# Patient Record
Sex: FEMALE | Race: WHITE | NOT HISPANIC OR LATINO | Employment: PART TIME | URBAN - METROPOLITAN AREA
[De-identification: names, ages, dates, MRNs, and addresses within clinical notes are randomized per-mention and may not be internally consistent; named-entity substitution may affect disease eponyms.]

---

## 2019-01-18 PROBLEM — E78.00 ELEVATED CHOLESTEROL: Status: ACTIVE | Noted: 2019-01-18

## 2019-04-15 PROBLEM — E66.9 OBESITY (BMI 30.0-34.9): Status: ACTIVE | Noted: 2019-04-15

## 2019-04-15 PROBLEM — E66.811 OBESITY (BMI 30.0-34.9): Status: ACTIVE | Noted: 2019-04-15

## 2019-04-29 ENCOUNTER — OFFICE VISIT (OUTPATIENT)
Dept: INTERNAL MEDICINE | Facility: CLINIC | Age: 59
End: 2019-04-29

## 2019-04-29 ENCOUNTER — LAB (OUTPATIENT)
Dept: LAB | Facility: HOSPITAL | Age: 59
End: 2019-04-29

## 2019-04-29 VITALS
HEART RATE: 60 BPM | WEIGHT: 159 LBS | DIASTOLIC BLOOD PRESSURE: 80 MMHG | SYSTOLIC BLOOD PRESSURE: 116 MMHG | BODY MASS INDEX: 29.26 KG/M2 | HEIGHT: 62 IN

## 2019-04-29 DIAGNOSIS — Z13.220 SCREENING, LIPID: ICD-10-CM

## 2019-04-29 DIAGNOSIS — Z20.5 EXPOSURE TO HEPATITIS C: ICD-10-CM

## 2019-04-29 DIAGNOSIS — Z23 NEED FOR HEPATITIS A VACCINATION: ICD-10-CM

## 2019-04-29 DIAGNOSIS — R21 RASH: ICD-10-CM

## 2019-04-29 DIAGNOSIS — Z00.00 PREVENTATIVE HEALTH CARE: Primary | ICD-10-CM

## 2019-04-29 DIAGNOSIS — Z13.29 SCREENING FOR ENDOCRINE DISORDER: ICD-10-CM

## 2019-04-29 DIAGNOSIS — E78.00 ELEVATED CHOLESTEROL: ICD-10-CM

## 2019-04-29 DIAGNOSIS — E66.3 OVERWEIGHT (BMI 25.0-29.9): ICD-10-CM

## 2019-04-29 LAB
ALBUMIN SERPL-MCNC: 4.2 G/DL (ref 3.5–5.2)
ALBUMIN/GLOB SERPL: 1.6 G/DL
ALP SERPL-CCNC: 50 U/L (ref 39–117)
ALT SERPL W P-5'-P-CCNC: 39 U/L (ref 1–33)
ANION GAP SERPL CALCULATED.3IONS-SCNC: 12.7 MMOL/L
AST SERPL-CCNC: 44 U/L (ref 1–32)
BILIRUB SERPL-MCNC: 0.7 MG/DL (ref 0.2–1.2)
BUN BLD-MCNC: 13 MG/DL (ref 8–23)
BUN/CREAT SERPL: 15.1 (ref 7–25)
CALCIUM SPEC-SCNC: 10 MG/DL (ref 8.6–10.5)
CHLORIDE SERPL-SCNC: 102 MMOL/L (ref 98–107)
CHOLEST SERPL-MCNC: 270 MG/DL (ref 0–200)
CO2 SERPL-SCNC: 26.3 MMOL/L (ref 22–29)
CREAT BLD-MCNC: 0.86 MG/DL (ref 0.57–1)
GFR SERPL CREATININE-BSD FRML MDRD: 67 ML/MIN/1.73
GLOBULIN UR ELPH-MCNC: 2.7 GM/DL
GLUCOSE BLD-MCNC: 92 MG/DL (ref 65–99)
HDLC SERPL-MCNC: 94 MG/DL (ref 40–60)
LDLC SERPL CALC-MCNC: 154 MG/DL (ref 0–100)
LDLC/HDLC SERPL: 1.64 {RATIO}
POTASSIUM BLD-SCNC: 4.4 MMOL/L (ref 3.5–5.2)
PROT SERPL-MCNC: 6.9 G/DL (ref 6–8.5)
SODIUM BLD-SCNC: 141 MMOL/L (ref 136–145)
TRIGL SERPL-MCNC: 111 MG/DL (ref 0–150)
TSH SERPL DL<=0.05 MIU/L-ACNC: 1.84 MIU/ML (ref 0.27–4.2)
VLDLC SERPL-MCNC: 22.2 MG/DL (ref 5–40)

## 2019-04-29 PROCEDURE — 84443 ASSAY THYROID STIM HORMONE: CPT

## 2019-04-29 PROCEDURE — 80061 LIPID PANEL: CPT

## 2019-04-29 PROCEDURE — 90471 IMMUNIZATION ADMIN: CPT | Performed by: INTERNAL MEDICINE

## 2019-04-29 PROCEDURE — 86803 HEPATITIS C AB TEST: CPT

## 2019-04-29 PROCEDURE — 80053 COMPREHEN METABOLIC PANEL: CPT

## 2019-04-29 PROCEDURE — 99396 PREV VISIT EST AGE 40-64: CPT | Performed by: INTERNAL MEDICINE

## 2019-04-29 PROCEDURE — 90632 HEPA VACCINE ADULT IM: CPT | Performed by: INTERNAL MEDICINE

## 2019-04-29 NOTE — ASSESSMENT & PLAN NOTE
Obesity is improving with lifestyle modifications.  .Goal to consume large amounts of vegetables and fruits,heart healthy fats and low carbohydrate choices. Encourage aerobic exercise of walking, jogging or biking gradually up to 150 minute a week and 2-3 times of weight resistance. Employe behavioral modifications such as daily meditation and stopping eating and drinking calories after 7 pm.

## 2019-04-29 NOTE — ASSESSMENT & PLAN NOTE
.Discussed improving diet and exercise. Specifically, decrease saturated fats and trans fats and avoid bad carbohydrates (sweet, breads , potatoes, and high caloric drinks).  Also aim for 150 minutes aerobic exercise weekly and resistance exercises 2-3x/week.

## 2019-04-29 NOTE — PROGRESS NOTES
"Chief Complaint   Patient presents with   • Annual Exam     fasting   follow up an skin and weight and labs    History of Present Illness  58 y.o. white female presents for wellness exam. She has had mild rash.    Review of Systems   Constitutional: Negative for chills, fatigue and fever.   HENT: Negative for congestion, ear pain and sinus pressure.    Respiratory: Negative for cough, chest tightness, shortness of breath and wheezing.    Cardiovascular: Negative for chest pain and palpitations.   Gastrointestinal: Negative for abdominal pain, blood in stool and constipation.   Musculoskeletal: Negative for arthralgias.   Skin: Positive for rash. Negative for color change.   Allergic/Immunologic: Negative for environmental allergies.   Neurological: Negative for dizziness, speech difficulty and headaches.   Hematological: Negative for adenopathy.   Psychiatric/Behavioral: Negative for confusion. The patient is not nervous/anxious.      All other ROS reviewed and negative.    PMSFH:  The following portions of the patient's history were reviewed and updated as appropriate: allergies, current medications, past family history, past medical history, past social history, past surgical history and problem list.    No current outpatient medications on file.    VITALS:  /80 (BP Location: Left arm, Patient Position: Sitting, Cuff Size: Adult)   Pulse 60   Ht 157.5 cm (62\")   Wt 72.1 kg (159 lb)   BMI 29.08 kg/m²     Physical Exam   Constitutional: She is oriented to person, place, and time. She appears well-developed and well-nourished.   HENT:   Head: Normocephalic.   Right Ear: External ear normal.   Left Ear: External ear normal.   Mouth/Throat: Oropharynx is clear and moist.   Eyes: Conjunctivae and EOM are normal.   Neck: No JVD present.   Cardiovascular: Normal rate, regular rhythm and normal heart sounds.   Pulmonary/Chest: Effort normal and breath sounds normal. No respiratory distress.   Abdominal: Soft. " Bowel sounds are normal. There is no tenderness.   Lymphadenopathy:     She has no cervical adenopathy.   Neurological: She is alert and oriented to person, place, and time.   Skin: Skin is warm and dry. Rash (mild erythema patches on left arm and scale left cheek) noted.   Psychiatric: She has a normal mood and affect. Her behavior is normal.   Nursing note and vitals reviewed.      LABS:  No results found for this or any previous visit.    Procedures         ASSESSMENT/PLAN:  Problem List Items Addressed This Visit        Cardiovascular and Mediastinum    Elevated cholesterol     .Discussed improving diet and exercise. Specifically, decrease saturated fats and trans fats and avoid bad carbohydrates (sweet, breads , potatoes, and high caloric drinks).  Also aim for 150 minutes aerobic exercise weekly and resistance exercises 2-3x/week.              Other    Overweight (BMI 25.0-29.9)     Obesity is improving with lifestyle modifications.  .Goal to consume large amounts of vegetables and fruits,heart healthy fats and low carbohydrate choices. Encourage aerobic exercise of walking, jogging or biking gradually up to 150 minute a week and 2-3 times of weight resistance. Employe behavioral modifications such as daily meditation and stopping eating and drinking calories after 7 pm.          Preventative health care - Primary     .Age-appropriate Counseling:  Discussed preventative medicine issues with patient including regular exercise, healthy diet, stress reduction, adequate sleep and recommended age-appropriate screening studies.  Immunizations reviewed.   Get Hepatitis A vaccine today and check for Hep C and check labs.                Other Visit Diagnoses     Screening for endocrine disorder        Relevant Orders    Lipid Panel    Comprehensive Metabolic Panel    Screening, lipid        Relevant Orders    TSH Rfx On Abnormal To Free T4    Exposure to hepatitis C        Relevant Orders    Hepatitis C Antibody     Need for hepatitis A vaccination        Relevant Orders    Hepatitis A Vaccine Adult IM (Completed)    Rash        Refer to Dermatology          FOLLOW-UP:  Return in about 1 year (around 4/29/2020) for Annual physical, Labs this visit.      Electronically signed by:    Mitul Robles MD

## 2019-04-29 NOTE — ASSESSMENT & PLAN NOTE
.Age-appropriate Counseling:  Discussed preventative medicine issues with patient including regular exercise, healthy diet, stress reduction, adequate sleep and recommended age-appropriate screening studies.  Immunizations reviewed.   Get Hepatitis A vaccine today and check for Hep C and check labs.

## 2019-04-30 LAB — HCV AB SER DONR QL: NORMAL

## 2019-05-20 ENCOUNTER — OFFICE VISIT (OUTPATIENT)
Dept: INTERNAL MEDICINE | Facility: CLINIC | Age: 59
End: 2019-05-20

## 2019-05-20 VITALS
TEMPERATURE: 98.4 F | WEIGHT: 162 LBS | DIASTOLIC BLOOD PRESSURE: 88 MMHG | SYSTOLIC BLOOD PRESSURE: 122 MMHG | HEIGHT: 62 IN | HEART RATE: 76 BPM | BODY MASS INDEX: 29.81 KG/M2

## 2019-05-20 DIAGNOSIS — R60.0 SALIVARY GLAND SWELLING: ICD-10-CM

## 2019-05-20 DIAGNOSIS — H92.01 ACUTE EAR PAIN, RIGHT: ICD-10-CM

## 2019-05-20 DIAGNOSIS — M54.2 NECK PAIN: Primary | ICD-10-CM

## 2019-05-20 PROCEDURE — 99214 OFFICE O/P EST MOD 30 MIN: CPT | Performed by: INTERNAL MEDICINE

## 2019-05-20 RX ORDER — CEFDINIR 300 MG/1
300 CAPSULE ORAL 2 TIMES DAILY
Qty: 14 CAPSULE | Refills: 0 | Status: SHIPPED | OUTPATIENT
Start: 2019-05-20 | End: 2019-05-27

## 2019-05-20 RX ORDER — TRAMADOL HYDROCHLORIDE 50 MG/1
50 TABLET ORAL EVERY 6 HOURS PRN
Qty: 30 TABLET | Refills: 0 | Status: SHIPPED | OUTPATIENT
Start: 2019-05-20 | End: 2020-08-24

## 2019-05-20 NOTE — PROGRESS NOTES
Chief Complaint   Patient presents with   • Neck Pain     R, started thursday   • Earache     R, started Friday       History of Present Illness  58 y.o. female presents for acute neck pain. The pain is on the right side. Onset was 5 days ago. The pain is gradually improving. Becomes worse at night. Aggravated by lying down and chewing. Associated symptoms include pressure in head, swelling, pain in right ear. Pertinent negatives include nausea.     Review of Systems   Constitutional: Negative for chills and fever.   HENT: Positive for ear pain, facial swelling and sinus pressure.    Eyes: Negative for pain.   Respiratory: Negative for cough and shortness of breath.    Cardiovascular: Negative for chest pain, palpitations and leg swelling.   Gastrointestinal: Negative for abdominal pain, diarrhea, nausea and vomiting.   Genitourinary: Negative for dysuria and hematuria.   Musculoskeletal: Positive for neck pain. Negative for arthralgias, back pain and joint swelling.   Skin: Negative for color change and rash.   Allergic/Immunologic: Negative for environmental allergies.   Neurological: Negative for dizziness, light-headedness and headaches.   Psychiatric/Behavioral: Negative for dysphoric mood. The patient is not nervous/anxious.    All other systems reviewed and are negative.    All other ROS reviewed and negative.    PMSFH:  The following portions of the patient's history were reviewed and updated as appropriate: allergies, current medications, past family history, past medical history, past social history, past surgical history and problem list.      Current Outpatient Medications:   •  cefdinir (OMNICEF) 300 MG capsule, Take 1 capsule by mouth 2 (Two) Times a Day for 7 days., Disp: 14 capsule, Rfl: 0  •  traMADol (ULTRAM) 50 MG tablet, Take 1 tablet by mouth Every 6 (Six) Hours As Needed for Moderate Pain ., Disp: 30 tablet, Rfl: 0    VITALS:  /88   Pulse 76   Temp 98.4 °F (36.9 °C)   Ht 157.5 cm  "(62.01\")   Wt 73.5 kg (162 lb)   BMI 29.62 kg/m²     Physical Exam   Constitutional: She is oriented to person, place, and time. She appears well-developed and well-nourished.   HENT:   Head: Normocephalic.   Right Ear: External ear normal.   Left Ear: External ear normal.   Mouth/Throat: Oropharynx is clear and moist.   Eyes: Conjunctivae and EOM are normal.   Neck:   Right sided preauricular fullness and pain   Cardiovascular: Normal rate, regular rhythm and normal heart sounds.   Pulmonary/Chest: Effort normal and breath sounds normal. No respiratory distress.   Abdominal: Soft. Bowel sounds are normal. There is no tenderness.   Musculoskeletal: She exhibits no edema.   Lymphadenopathy:     She has cervical adenopathy.   Neurological: She is alert and oriented to person, place, and time.   Skin: Skin is warm and dry.   Psychiatric: She has a normal mood and affect. Her behavior is normal.   Nursing note and vitals reviewed.      LABS:  Results for orders placed or performed in visit on 04/29/19   Lipid Panel   Result Value Ref Range    Total Cholesterol 270 (H) 0 - 200 mg/dL    Triglycerides 111 0 - 150 mg/dL    HDL Cholesterol 94 (H) 40 - 60 mg/dL    LDL Cholesterol  154 (H) 0 - 100 mg/dL    VLDL Cholesterol 22.2 5 - 40 mg/dL    LDL/HDL Ratio 1.64    Comprehensive Metabolic Panel   Result Value Ref Range    Glucose 92 65 - 99 mg/dL    BUN 13 8 - 23 mg/dL    Creatinine 0.86 0.57 - 1.00 mg/dL    Sodium 141 136 - 145 mmol/L    Potassium 4.4 3.5 - 5.2 mmol/L    Chloride 102 98 - 107 mmol/L    CO2 26.3 22.0 - 29.0 mmol/L    Calcium 10.0 8.6 - 10.5 mg/dL    Total Protein 6.9 6.0 - 8.5 g/dL    Albumin 4.20 3.50 - 5.20 g/dL    ALT (SGPT) 39 (H) 1 - 33 U/L    AST (SGOT) 44 (H) 1 - 32 U/L    Alkaline Phosphatase 50 39 - 117 U/L    Total Bilirubin 0.7 0.2 - 1.2 mg/dL    eGFR Non African Amer 67 >60 mL/min/1.73    Globulin 2.7 gm/dL    A/G Ratio 1.6 g/dL    BUN/Creatinine Ratio 15.1 7.0 - 25.0    Anion Gap 12.7 mmol/L "   TSH Rfx On Abnormal To Free T4   Result Value Ref Range    TSH 1.840 0.270 - 4.200 mIU/mL   Hepatitis C Antibody   Result Value Ref Range    Hepatitis C Ab Non-Reactive Non-Reactive       Procedures         ASSESSMENT/PLAN:  Problem List Items Addressed This Visit        Nervous and Auditory    Neck pain - Primary     RX for Tramadol.   She is aware of the [facility] Controlled Substance Contract.  ANAYA will be performed, The patient is aware of the potential for addiction and dependence as well as risks of drug interactions and potential side effects.  Patient voices understanding and would like to proceed with medication as prescribed.           Other Visit Diagnoses     Salivary gland swelling        Warm compresses and occasionally suck on regan and cough drops. Refer to ENT.    Relevant Orders    Ambulatory Referral to ENT (Otolaryngology) (Completed)    Acute ear pain, right        Acute pain that started a day after the onset of neck pain. Start on cefdinir antibiotics. Make sure to get plenty of rest and push fluids. Refer to ENT.     Relevant Orders    Ambulatory Referral to ENT (Otolaryngology) (Completed)          FOLLOW-UP:  Return for Next scheduled follow up.      Electronically signed by:    Mitul Robles MD

## 2019-05-20 NOTE — ASSESSMENT & PLAN NOTE
RX for Tramadol.   She is aware of the [facility] Controlled Substance Contract.  ANAYA will be performed, The patient is aware of the potential for addiction and dependence as well as risks of drug interactions and potential side effects.  Patient voices understanding and would like to proceed with medication as prescribed.

## 2019-09-30 ENCOUNTER — FLU SHOT (OUTPATIENT)
Dept: INTERNAL MEDICINE | Facility: CLINIC | Age: 59
End: 2019-09-30

## 2019-09-30 DIAGNOSIS — Z23 FLU VACCINE NEED: ICD-10-CM

## 2019-09-30 PROCEDURE — 90674 CCIIV4 VAC NO PRSV 0.5 ML IM: CPT | Performed by: INTERNAL MEDICINE

## 2019-09-30 PROCEDURE — 90471 IMMUNIZATION ADMIN: CPT | Performed by: INTERNAL MEDICINE

## 2020-07-08 ENCOUNTER — PATIENT MESSAGE (OUTPATIENT)
Dept: INTERNAL MEDICINE | Facility: CLINIC | Age: 60
End: 2020-07-08

## 2020-08-24 RX ORDER — IBUPROFEN 200 MG
200 TABLET ORAL EVERY 6 HOURS PRN
COMMUNITY
End: 2020-10-05 | Stop reason: ALTCHOICE

## 2020-08-25 ENCOUNTER — OFFICE VISIT (OUTPATIENT)
Dept: BARIATRICS/WEIGHT MGMT | Facility: CLINIC | Age: 60
End: 2020-08-25

## 2020-08-25 VITALS
SYSTOLIC BLOOD PRESSURE: 116 MMHG | RESPIRATION RATE: 18 BRPM | OXYGEN SATURATION: 99 % | DIASTOLIC BLOOD PRESSURE: 60 MMHG | TEMPERATURE: 98 F | WEIGHT: 171 LBS | HEIGHT: 62 IN | BODY MASS INDEX: 31.47 KG/M2 | HEART RATE: 58 BPM

## 2020-08-25 DIAGNOSIS — G47.00 INSOMNIA, UNSPECIFIED TYPE: ICD-10-CM

## 2020-08-25 DIAGNOSIS — E66.9 OBESITY, CLASS I, BMI 30-34.9: Primary | ICD-10-CM

## 2020-08-25 DIAGNOSIS — Z86.39 HISTORY OF ELEVATED LIPIDS: ICD-10-CM

## 2020-08-25 PROBLEM — E66.09 CLASS 1 OBESITY DUE TO EXCESS CALORIES WITHOUT SERIOUS COMORBIDITY WITH BODY MASS INDEX (BMI) OF 31.0 TO 31.9 IN ADULT: Status: ACTIVE | Noted: 2020-08-25

## 2020-08-25 PROBLEM — E66.811 CLASS 1 OBESITY DUE TO EXCESS CALORIES WITHOUT SERIOUS COMORBIDITY WITH BODY MASS INDEX (BMI) OF 31.0 TO 31.9 IN ADULT: Status: ACTIVE | Noted: 2020-08-25

## 2020-08-25 PROCEDURE — MEDWTNEWPT: Performed by: NURSE PRACTITIONER

## 2020-08-25 RX ORDER — LORATADINE 10 MG/1
10 TABLET ORAL DAILY
COMMUNITY
End: 2020-10-05 | Stop reason: ALTCHOICE

## 2020-08-25 RX ORDER — ACETAMINOPHEN 500 MG
500 TABLET ORAL EVERY 6 HOURS PRN
COMMUNITY
End: 2020-10-05

## 2020-08-25 RX ORDER — MELATONIN 3 MG
TABLET ORAL
Qty: 90 EACH | Refills: 0
Start: 2020-08-25 | End: 2020-10-05

## 2020-08-25 NOTE — PROGRESS NOTES
[facility]  [address]  [place] [address]    Name: Varsha Lockhart  : 1960  Patient Care Team:  Mitul Robles MD as PCP - General (Internal Medicine)  Mitul Robles MD as PCP - Internal Medicine (Internal Medicine)    Chief Complaint;:   Chief Complaint   Patient presents with   • Nutrition Counseling   • Weight Loss        HPI   Office visit for Ms. Varsha Lockhart is a 60 y.o. female with Class 1 obesity and history of hyperlipidemia here to restart the weight loss program after an absence of 1year and 8 months. She is currently at her highest weight of her life at 171 lbs. She previously used medications including phentermine, Phenfen, and ephedra but tended to stop seeing weight drop after loosing around 10 lbs while on sympathomimetics.  The patient is not using a food journal but is open to the idea.  The patient is checking weight regularly on a weekly basis. Body mass index is 31.28 kg/m².  Recently has had to decrease her physical activity due to L knee injuries and unable to play Tennis, which was a stress and social outlet for her. She see PT 2x day for this injury. She does try to remain physically active. She continues to walk around 15 miles/week. She tends to have night time cravings for cracker and other snacky carbohydrates after dinner into the night. She typically eats dinner around 5:30 because  sleeps early around 7:30pm. She has had trouble with insomnia since menopause but has never tried any medications for sleep so far.  She currently does not take a multivitamin or Omega-3 supplement.  She eats 5 servings of fish week.       Review of Systems   Constitutional: Negative for activity change (denies slow movement) and fatigue.        Denies diminished sweating,  loss of appetite   HENT: Negative for facial swelling (deneis swelling of face and eyelids) and voice change (denies hoarseness).         Denies tongue  "changes   Respiratory:        Denies difficulty breathing   Cardiovascular: Negative for palpitations and leg swelling.   Gastrointestinal: Negative for constipation.   Endocrine: Negative for cold intolerance.        Denies dry, course skin, course hair, loss of hair, slowed movement   Genitourinary:        Denies excessive/painful menses   Skin:        Denies pale skin, brittle nails   Neurological: Negative for speech difficulty (denies slowed speech), weakness and headache.   Psychiatric/Behavioral: Negative for depressed mood. The patient is not nervous/anxious.         Denies emotional instability       The patient is exercising with a FITT score of:     Frequency   Intensity Time Strength Training   []   0 None  []   0 None  []   0 None  []   0 None    []   1 (1-2x/week) []   1 (light) []   1 (<10 min) []   1 (1x/week)   []   2 (3-5x/week) []   2 (moderate) []   2 (10-20 min) [x]   2 (2x/week)   [x]   3 (daily)   [x]   3 (moderately hard)  []   4 (very hard) []   3 (20-30 min)  [x]   4 (>30 min) []   3 (3-4x/week)         VS   Vitals:    20 0902   BP: 116/60   BP Location: Left arm   Patient Position: Sitting   Cuff Size: Large Adult   Pulse: 58   Resp: 18   Temp: 98 °F (36.7 °C)   TempSrc: Temporal   SpO2: 99%   Weight: 77.6 kg (171 lb)   Height: 157.5 cm (62\")       Restart Weight:  171lb  Change in weight since last visit: 18lb gain    Body composition analysis completed and showed:  %body fat: 40.9%  Total fat mass (in lbs): 70.0  Fat free mass (in lbs): 101.0  Total body water (in lb): 74.0  BMR: 1401     Measurements (in inches)  Neck: 13  Chest: 42.5  Waist: 38  Hips: 42.5  Thighs: 39    History    Past Medical History:   Diagnosis Date   • Depression    • Headache     negative CT scan of head   • High cholesterol    •  labor     and pre-eclampsia with loss of  child   • Strain of right Achilles tendon 2016    massage therapy and rest       Patient Active Problem List   Diagnosis "   • Elevated cholesterol   • Overweight   • Preventative health care   • Neck pain   • Class 1 obesity due to excess calories without serious comorbidity with body mass index (BMI) of 31.0 to 31.9 in adult       No Known Allergies      Current Outpatient Medications:   •  acetaminophen (TYLENOL) 500 MG tablet, Take 500 mg by mouth Every 6 (Six) Hours As Needed for Mild Pain ., Disp: , Rfl:   •  ibuprofen (ADVIL,MOTRIN) 200 MG tablet, Take 200 mg by mouth Every 6 (Six) Hours As Needed for Mild Pain ., Disp: , Rfl:   •  loratadine (Allergy) 10 MG tablet, Take 10 mg by mouth Daily., Disp: , Rfl:   •  5-Hydroxytryptophan (5-HTP) 50 MG capsule, Use up to 6 sprays daily. May cause drowsiness with full dose., Disp: 90 each, Rfl: 0  •  Multiple Vitamins-Minerals (MULTIVITAMIN ADULTS) tablet, Take 1 tablet by mouth Daily., Disp: 30 tablet, Rfl: 2    Physical Exam:    General:  well developed; well nourished  no acute distress   Lungs:  breathing is unlabored  clear to auscultation bilaterally   Heart:  Joce Exam-cardiac obesity: regular rate and rhythm, S1, S2 normal, no murmur, click, rub or gallop       ASSESSMENT/PLAN:       Restart program and recommended supplements. Advised that it is okay to not take an omega 3 supplement due to her high natural fish intake.  Discussed Bring back nutritional focus and work on lifestyle behavioral changes including sleep hygrine, handout provided.  Recommend restarting food journal using goal card for guidance.     I have instructed patient to restart the pursuit of medical weight loss as a part of this program.  The current plan for this month includes:   1) Restart HTP& multivitamin  2) Track daily food, discussed electronic food journal katey [employer] pal   3) Consider meal replacement with one protein shake/day and one other food based set meal replacement  4) Eat a high protein snack between dinner and 8pm  5) Set 8pm as a time she stops eating for the evening  6) Practice  sleep hygiene habits  7) Get labwork prior to next appointment     Patient will work toward a FITT score of:     Frequency   Intensity Time Strength Training   []   0 None  []   0 None  []   0 None  [x]   0 None    []   1 (1-2x/week) [x]   1 (light) []   1 (<10 min) []   1 (1x/week)   []   2 (3-5x/week) [x]   2 (moderate) []   2 (10-20 min) []   2 (2x/week)   [x]   3 (daily)   []   3 (moderately hard)  []   4 (very hard) [x]   3 (20-30 min)  []   4 (>30 min) []   3 (3-4x/week)       Plan of care reviewed with patient at the conclusion of today's visit. We discussed the risks, benefits, and limitations of treatments. Patient verbalizes understanding of and agreement with management plan.     The patient has read and signed the [facility] Controlled Substance Contract. We discussed the risks and benefits of the use of controlled substances, including the risk of tolerance and drug dependence. Anorectic medications can be prescribed by one provider at a time and dispensed from one facility at a time, they can only be taken as prescribed, and we are not obligated to refill them if lost or stolen. The refills are only during regular clinic hours. Frederic report was pulled on patient, reviewed and found to be appropriate.      Return in about 2 weeks (around 9/8/2020) for Next scheduled follow up.     JAS Mayorga

## 2020-08-28 ENCOUNTER — LAB (OUTPATIENT)
Dept: LAB | Facility: HOSPITAL | Age: 60
End: 2020-08-28

## 2020-08-28 DIAGNOSIS — Z86.39 HISTORY OF ELEVATED LIPIDS: ICD-10-CM

## 2020-08-28 DIAGNOSIS — E66.9 OBESITY, CLASS I, BMI 30-34.9: ICD-10-CM

## 2020-08-28 LAB
ALBUMIN SERPL-MCNC: 4.2 G/DL (ref 3.5–5.2)
ALBUMIN/GLOB SERPL: 1.5 G/DL
ALP SERPL-CCNC: 50 U/L (ref 39–117)
ALT SERPL W P-5'-P-CCNC: 96 U/L (ref 1–33)
ANION GAP SERPL CALCULATED.3IONS-SCNC: 11.9 MMOL/L (ref 5–15)
AST SERPL-CCNC: 60 U/L (ref 1–32)
BASOPHILS # BLD AUTO: 0.04 10*3/MM3 (ref 0–0.2)
BASOPHILS NFR BLD AUTO: 0.8 % (ref 0–1.5)
BILIRUB SERPL-MCNC: 0.7 MG/DL (ref 0–1.2)
BUN SERPL-MCNC: 18 MG/DL (ref 8–23)
BUN/CREAT SERPL: 22.2 (ref 7–25)
CALCIUM SPEC-SCNC: 9.4 MG/DL (ref 8.6–10.5)
CHLORIDE SERPL-SCNC: 103 MMOL/L (ref 98–107)
CHOLEST SERPL-MCNC: 260 MG/DL (ref 0–200)
CO2 SERPL-SCNC: 25.1 MMOL/L (ref 22–29)
CREAT SERPL-MCNC: 0.81 MG/DL (ref 0.57–1)
DEPRECATED RDW RBC AUTO: 47 FL (ref 37–54)
EOSINOPHIL # BLD AUTO: 0.49 10*3/MM3 (ref 0–0.4)
EOSINOPHIL NFR BLD AUTO: 9.9 % (ref 0.3–6.2)
ERYTHROCYTE [DISTWIDTH] IN BLOOD BY AUTOMATED COUNT: 12.6 % (ref 12.3–15.4)
GFR SERPL CREATININE-BSD FRML MDRD: 72 ML/MIN/1.73
GLOBULIN UR ELPH-MCNC: 2.8 GM/DL
GLUCOSE SERPL-MCNC: 91 MG/DL (ref 65–99)
HCT VFR BLD AUTO: 47.8 % (ref 34–46.6)
HDLC SERPL-MCNC: 77 MG/DL (ref 40–60)
HGB BLD-MCNC: 15.8 G/DL (ref 12–15.9)
IMM GRANULOCYTES # BLD AUTO: 0 10*3/MM3 (ref 0–0.05)
IMM GRANULOCYTES NFR BLD AUTO: 0 % (ref 0–0.5)
LDLC SERPL CALC-MCNC: 166 MG/DL (ref 0–100)
LDLC/HDLC SERPL: 2.15 {RATIO}
LYMPHOCYTES # BLD AUTO: 1.99 10*3/MM3 (ref 0.7–3.1)
LYMPHOCYTES NFR BLD AUTO: 40.1 % (ref 19.6–45.3)
MCH RBC QN AUTO: 33 PG (ref 26.6–33)
MCHC RBC AUTO-ENTMCNC: 33.1 G/DL (ref 31.5–35.7)
MCV RBC AUTO: 99.8 FL (ref 79–97)
MONOCYTES # BLD AUTO: 0.55 10*3/MM3 (ref 0.1–0.9)
MONOCYTES NFR BLD AUTO: 11.1 % (ref 5–12)
NEUTROPHILS NFR BLD AUTO: 1.89 10*3/MM3 (ref 1.7–7)
NEUTROPHILS NFR BLD AUTO: 38.1 % (ref 42.7–76)
NRBC BLD AUTO-RTO: 0 /100 WBC (ref 0–0.2)
PLATELET # BLD AUTO: 217 10*3/MM3 (ref 140–450)
PMV BLD AUTO: 11.8 FL (ref 6–12)
POTASSIUM SERPL-SCNC: 4.5 MMOL/L (ref 3.5–5.2)
PROT SERPL-MCNC: 7 G/DL (ref 6–8.5)
RBC # BLD AUTO: 4.79 10*6/MM3 (ref 3.77–5.28)
SODIUM SERPL-SCNC: 140 MMOL/L (ref 136–145)
T3FREE SERPL-MCNC: 3.46 PG/ML (ref 2–4.4)
T4 FREE SERPL-MCNC: 1.18 NG/DL (ref 0.93–1.7)
TRIGL SERPL-MCNC: 87 MG/DL (ref 0–150)
TSH SERPL DL<=0.05 MIU/L-ACNC: 2.51 UIU/ML (ref 0.27–4.2)
VLDLC SERPL-MCNC: 17.4 MG/DL (ref 5–40)
WBC # BLD AUTO: 4.96 10*3/MM3 (ref 3.4–10.8)

## 2020-08-28 PROCEDURE — 84443 ASSAY THYROID STIM HORMONE: CPT

## 2020-08-28 PROCEDURE — 80053 COMPREHEN METABOLIC PANEL: CPT

## 2020-08-28 PROCEDURE — 36415 COLL VENOUS BLD VENIPUNCTURE: CPT

## 2020-08-28 PROCEDURE — 82652 VIT D 1 25-DIHYDROXY: CPT

## 2020-08-28 PROCEDURE — 83525 ASSAY OF INSULIN: CPT

## 2020-08-28 PROCEDURE — 84439 ASSAY OF FREE THYROXINE: CPT

## 2020-08-28 PROCEDURE — 84481 FREE ASSAY (FT-3): CPT

## 2020-08-28 PROCEDURE — 80061 LIPID PANEL: CPT

## 2020-08-28 PROCEDURE — 85025 COMPLETE CBC W/AUTO DIFF WBC: CPT

## 2020-08-31 LAB
1,25(OH)2D3 SERPL-MCNC: 37.6 PG/ML (ref 19.9–79.3)
INSULIN SERPL-ACNC: 13.7 UIU/ML (ref 2.6–24.9)

## 2020-09-01 PROBLEM — R74.8 ELEVATED LIVER ENZYMES: Status: ACTIVE | Noted: 2020-09-01

## 2020-09-08 ENCOUNTER — OFFICE VISIT (OUTPATIENT)
Dept: BARIATRICS/WEIGHT MGMT | Facility: CLINIC | Age: 60
End: 2020-09-08

## 2020-09-08 VITALS
SYSTOLIC BLOOD PRESSURE: 110 MMHG | WEIGHT: 165 LBS | TEMPERATURE: 97.7 F | OXYGEN SATURATION: 99 % | HEART RATE: 60 BPM | HEIGHT: 62 IN | DIASTOLIC BLOOD PRESSURE: 62 MMHG | BODY MASS INDEX: 30.36 KG/M2 | RESPIRATION RATE: 18 BRPM

## 2020-09-08 DIAGNOSIS — E78.00 ELEVATED CHOLESTEROL: ICD-10-CM

## 2020-09-08 DIAGNOSIS — E66.9 OBESITY, CLASS I, BMI 30-34.9: Primary | ICD-10-CM

## 2020-09-08 PROCEDURE — MEDWTESTPT: Performed by: NURSE PRACTITIONER

## 2020-09-08 RX ORDER — PHENTERMINE HYDROCHLORIDE 37.5 MG/1
TABLET ORAL
Qty: 15 TABLET | Refills: 0 | Status: SHIPPED | OUTPATIENT
Start: 2020-09-08 | End: 2020-10-08

## 2020-09-08 NOTE — PROGRESS NOTES
"[facility]   On license of [facility] [address]  [place] [address]    Name: Varsha Lockhart  : 1960  Patient Care Team:  Mitul Robles MD as PCP - General (Internal Medicine)  Mitul Robles MD as PCP - Internal Medicine (Internal Medicine)    Chief Complaint;:   Chief Complaint   Patient presents with   • Follow-up   • Nutrition Counseling        HPI      Office visit for Varsha Lockhart, a 60 y.o. female, established patient with amelia , for medical weight loss. Patient is satisfied with weight loss progress. Hunger control has improved. Keeping the same activity, walking 15 mile per week, PT 2x week and personal training with weights and stretching class. Intensity is limited due to Left knee injury. Has kept a very close food journal and feels \"that is the key for her\" loosing weight. She has been able to keep the carbohydrate down to the 50-75g per but has not been able to consume the fruits and vegetables that she normally does. Her calories have remained between 1978-6937 but Has been able to increase her protein levels but feels that it is a struggle and that protein does not always keep her full. She has adequate hunger control through out the day but not in the evening after work. She has limited her nighttime eating to not later than 8pm but has had a few evenings this did not occur.      Body mass index is 30.18 kg/m².    The patient is exercising with a FITT score of:    Frequency   Intensity Time Strength Training   []   0 None  []   0 None  []   0 None  []   0 None    []   1 (1-2x/week) []   1 (light) []   1 (<10 min) []   1 (1x/week)   [x]   2 (3-5x/week) [x]   2 (moderate) []   2 (10-20 min) [x]   2 (2x/week)   []   3 (daily)   []   3 (moderately hard)  []   4 (very hard) []   3 (20-30 min)  [x]   4 (>30 min) []   3 (3-4x/week)       Change in weight since last visit: 6lb loss  Recent Weight History:   Wt Readings from Last 3 Encounters:   20 74.8 kg " "(165 lb)   20 77.6 kg (171 lb)   19 73.5 kg (162 lb)     Start Weight: 171lb  Total Loss/%Loss of BBW: -3.51%    Body composition analysis completed and showed:   BMR: 1375  %body fat: 41.9%  Total fat mass (in lbs):69.0   Fat free mass (in lbs):96.0  Total body water (in lb):70.5    VS   Vitals:    20 0858   BP: 110/62   BP Location: Left arm   Patient Position: Sitting   Cuff Size: Large Adult   Pulse: 60   Resp: 18   Temp: 97.7 °F (36.5 °C)   TempSrc: Temporal   SpO2: 99%   Weight: 74.8 kg (165 lb)   Height: 157.5 cm (62\")       Measurements (in inches)  Waist: 37.75    Past Medical History:   Diagnosis Date   • Depression    • Headache     negative CT scan of head   • High cholesterol    •  labor     and pre-eclampsia with loss of  child   • Strain of right Achilles tendon 2016    massage therapy and rest       Patient Active Problem List   Diagnosis   • Elevated cholesterol   • Overweight   • Preventative health care   • Neck pain   • Class 1 obesity due to excess calories without serious comorbidity with body mass index (BMI) of 31.0 to 31.9 in adult   • Elevated liver enzymes       No Known Allergies      Current Outpatient Medications:   •  5-Hydroxytryptophan (5-HTP) 50 MG capsule, Use up to 6 sprays daily. May cause drowsiness with full dose., Disp: 90 each, Rfl: 0  •  acetaminophen (TYLENOL) 500 MG tablet, Take 500 mg by mouth Every 6 (Six) Hours As Needed for Mild Pain ., Disp: , Rfl:   •  ibuprofen (ADVIL,MOTRIN) 200 MG tablet, Take 200 mg by mouth Every 6 (Six) Hours As Needed for Mild Pain ., Disp: , Rfl:   •  loratadine (Allergy) 10 MG tablet, Take 10 mg by mouth Daily., Disp: , Rfl:   •  Multiple Vitamins-Minerals (MULTIVITAMIN ADULTS) tablet, Take 1 tablet by mouth Daily., Disp: 30 tablet, Rfl: 2  •  phentermine (ADIPEX-P) 37.5 MG tablet, 1/2 tab at 3pm., Disp: 15 tablet, Rfl: 0    Physical Exam:    General:  .well developed; well nourished  no acute distress  obese  "   Lungs:  breathing is unlabored  clear to auscultation bilaterally   Heart:  regular rate and rhythm, S1, S2 normal, no murmur, click, rub or gallop     ASSESSMENT/PLAN:   Varsha was seen today for follow-up and nutrition counseling.    Diagnoses and all orders for this visit:    Obesity, Class I, BMI 30-34.9  -     phentermine (ADIPEX-P) 37.5 MG tablet; 1/2 tab at 3pm.    Elevated cholesterol        I have instructed the patient to continue with pursuit of medical weight loss as a part of this program. Patient does meet criteria for use of anorectics at this time as BMI >27. Continue nutritional focus and work towards new exercise FITT goal of:     Frequency   Intensity Time Strength Training   []   0 None  []   0 None  []   0 None  []   0 None    []   1 (1-2x/week) []   1 (light) []   1 (<10 min) []   1 (1x/week)   [x]   2 (3-5x/week) []   2 (moderate) []   2 (10-20 min) []   2 (2x/week)   []   3 (daily)   [x]   3 (moderately hard)  []   4 (very hard) []   3 (20-30 min)  [x]   4 (>30 min) [x]   3 (3-4x/week)       The current plan for this month includes: continue current exercise efforts, restart anorectic (weight loss) medications as discuss and weight loss goal 4-6lbs this month. Start phentermine 1/2 tab a 3 pm for evening hunger control.  Goal of 5 lb this next month. Discussed elevated lipid levels and liver enzymes. She has an apt in Oct 2020. Advised to discuss Dr. Robles. Keep up great focus on activity. Continue food and focus with focus and not eating after 8pm.     I spent 25 minutes face to face with patient and 20 minutes were spent counseling the patient on obesity and behavior change.     Plan of care reviewed with the patient at the conclusion of today's visit. We discussed the risks, benefits, and limitations of treatments. Continue medications and OTC supplements as discussed. Patient verbalizes understanding of and agreement with management plan.      Return in about 4 weeks (around 10/6/2020)  for in office at pt request.      JAS Mayorga APRN

## 2020-10-05 ENCOUNTER — OFFICE VISIT (OUTPATIENT)
Dept: INTERNAL MEDICINE | Facility: CLINIC | Age: 60
End: 2020-10-05

## 2020-10-05 VITALS
TEMPERATURE: 97.7 F | OXYGEN SATURATION: 96 % | DIASTOLIC BLOOD PRESSURE: 80 MMHG | HEART RATE: 79 BPM | HEIGHT: 62 IN | SYSTOLIC BLOOD PRESSURE: 122 MMHG | BODY MASS INDEX: 30.18 KG/M2 | WEIGHT: 164 LBS

## 2020-10-05 DIAGNOSIS — E66.09 CLASS 1 OBESITY DUE TO EXCESS CALORIES WITHOUT SERIOUS COMORBIDITY WITH BODY MASS INDEX (BMI) OF 31.0 TO 31.9 IN ADULT: ICD-10-CM

## 2020-10-05 DIAGNOSIS — E78.00 ELEVATED CHOLESTEROL: ICD-10-CM

## 2020-10-05 DIAGNOSIS — R74.8 ELEVATED LIVER ENZYMES: ICD-10-CM

## 2020-10-05 DIAGNOSIS — Z85.828 HISTORY OF SKIN CANCER: ICD-10-CM

## 2020-10-05 DIAGNOSIS — Z23 NEED FOR VACCINATION: ICD-10-CM

## 2020-10-05 DIAGNOSIS — Z00.00 PREVENTATIVE HEALTH CARE: Primary | ICD-10-CM

## 2020-10-05 PROCEDURE — 90471 IMMUNIZATION ADMIN: CPT | Performed by: INTERNAL MEDICINE

## 2020-10-05 PROCEDURE — 90472 IMMUNIZATION ADMIN EACH ADD: CPT | Performed by: INTERNAL MEDICINE

## 2020-10-05 PROCEDURE — 90686 IIV4 VACC NO PRSV 0.5 ML IM: CPT | Performed by: INTERNAL MEDICINE

## 2020-10-05 PROCEDURE — 90632 HEPA VACCINE ADULT IM: CPT | Performed by: INTERNAL MEDICINE

## 2020-10-05 PROCEDURE — 99396 PREV VISIT EST AGE 40-64: CPT | Performed by: INTERNAL MEDICINE

## 2020-10-05 NOTE — ASSESSMENT & PLAN NOTE
Her eyes are ok with [employer]. She is good with her colonoscopy. Get labs again in 5 months. Her mood is good overall.

## 2020-10-05 NOTE — PROGRESS NOTES
Chief Complaint   Patient presents with   • Annual Exam     Labs done in Aug.        History of Present Illness  60 y.o. white female presents for wellness exam. She complains of left great toe not able to bend but no pain.     Review of Systems   Constitutional: Negative for chills, fatigue and fever.   HENT: Negative for congestion, ear pain and sinus pressure.    Eyes: Negative for visual disturbance.   Respiratory: Negative for cough, chest tightness, shortness of breath and wheezing.    Cardiovascular: Negative for chest pain and palpitations.   Gastrointestinal: Negative for abdominal pain, blood in stool and constipation.   Skin: Negative for color change.   Allergic/Immunologic: Negative for environmental allergies.   Neurological: Negative for dizziness, speech difficulty and headaches.   Hematological: Negative for adenopathy.   Psychiatric/Behavioral: Negative for confusion. The patient is not nervous/anxious.      All other ROS reviewed and negative.    PMSFH:  The following portions of the patient's history were reviewed and updated as appropriate: allergies, current medications, past family history, past medical history, past social history, past surgical history and problem list.      Current Outpatient Medications:   •  phentermine (ADIPEX-P) 37.5 MG tablet, 1/2 tab at 3pm., Disp: 15 tablet, Rfl: 0  •  5-Hydroxytryptophan (5-HTP) 50 MG capsule, Use up to 6 sprays daily. May cause drowsiness with full dose., Disp: 90 each, Rfl: 0  •  acetaminophen (TYLENOL) 500 MG tablet, Take 500 mg by mouth Every 6 (Six) Hours As Needed for Mild Pain ., Disp: , Rfl:   •  ibuprofen (ADVIL,MOTRIN) 200 MG tablet, Take 200 mg by mouth Every 6 (Six) Hours As Needed for Mild Pain ., Disp: , Rfl:   •  loratadine (Allergy) 10 MG tablet, Take 10 mg by mouth Daily., Disp: , Rfl:   •  Multiple Vitamins-Minerals (MULTIVITAMIN ADULTS) tablet, Take 1 tablet by mouth Daily., Disp: 30 tablet, Rfl: 2    VITALS:  /80 (BP  "Location: Left arm, Patient Position: Sitting, Cuff Size: Adult)   Pulse 79   Temp 97.7 °F (36.5 °C)   Ht 157.5 cm (62\")   Wt 74.4 kg (164 lb)   SpO2 96%   BMI 30.00 kg/m²     Physical Exam  Vitals signs and nursing note reviewed.   Constitutional:       Appearance: She is well-developed.   HENT:      Head: Normocephalic and atraumatic.      Right Ear: Tympanic membrane normal.      Left Ear: Tympanic membrane normal.   Eyes:      Conjunctiva/sclera: Conjunctivae normal.   Cardiovascular:      Rate and Rhythm: Normal rate and regular rhythm.      Heart sounds: Normal heart sounds.   Pulmonary:      Effort: Pulmonary effort is normal. No respiratory distress.      Breath sounds: Normal breath sounds.   Abdominal:      General: Bowel sounds are normal.      Palpations: Abdomen is soft.      Tenderness: There is no abdominal tenderness.      Comments: obese   Musculoskeletal:         General: No swelling.   Skin:     General: Skin is warm and dry.   Neurological:      General: No focal deficit present.      Mental Status: She is alert and oriented to person, place, and time.   Psychiatric:         Mood and Affect: Mood normal.         Behavior: Behavior normal.         LABS:  Results for orders placed or performed in visit on 08/28/20   Vitamin D 1,25 Dihydroxy    Specimen: Blood   Result Value Ref Range    1,25-Dihydroxy, Vitamin D 37.6 19.9 - 79.3 pg/mL   TSH    Specimen: Blood   Result Value Ref Range    TSH 2.510 0.270 - 4.200 uIU/mL   Comprehensive Metabolic Panel    Specimen: Blood   Result Value Ref Range    Glucose 91 65 - 99 mg/dL    BUN 18 8 - 23 mg/dL    Creatinine 0.81 0.57 - 1.00 mg/dL    Sodium 140 136 - 145 mmol/L    Potassium 4.5 3.5 - 5.2 mmol/L    Chloride 103 98 - 107 mmol/L    CO2 25.1 22.0 - 29.0 mmol/L    Calcium 9.4 8.6 - 10.5 mg/dL    Total Protein 7.0 6.0 - 8.5 g/dL    Albumin 4.20 3.50 - 5.20 g/dL    ALT (SGPT) 96 (H) 1 - 33 U/L    AST (SGOT) 60 (H) 1 - 32 U/L    Alkaline Phosphatase 50 " 39 - 117 U/L    Total Bilirubin 0.7 0.0 - 1.2 mg/dL    eGFR Non African Amer 72 >60 mL/min/1.73    Globulin 2.8 gm/dL    A/G Ratio 1.5 g/dL    BUN/Creatinine Ratio 22.2 7.0 - 25.0    Anion Gap 11.9 5.0 - 15.0 mmol/L   Lipid Panel    Specimen: Blood   Result Value Ref Range    Total Cholesterol 260 (H) 0 - 200 mg/dL    Triglycerides 87 0 - 150 mg/dL    HDL Cholesterol 77 (H) 40 - 60 mg/dL    LDL Cholesterol  166 (H) 0 - 100 mg/dL    VLDL Cholesterol 17.4 5 - 40 mg/dL    LDL/HDL Ratio 2.15    T4, Free    Specimen: Blood   Result Value Ref Range    Free T4 1.18 0.93 - 1.70 ng/dL   T3, Free    Specimen: Blood   Result Value Ref Range    T3, Free 3.46 2.00 - 4.40 pg/mL   CBC Auto Differential    Specimen: Blood   Result Value Ref Range    WBC 4.96 3.40 - 10.80 10*3/mm3    RBC 4.79 3.77 - 5.28 10*6/mm3    Hemoglobin 15.8 12.0 - 15.9 g/dL    Hematocrit 47.8 (H) 34.0 - 46.6 %    MCV 99.8 (H) 79.0 - 97.0 fL    MCH 33.0 26.6 - 33.0 pg    MCHC 33.1 31.5 - 35.7 g/dL    RDW 12.6 12.3 - 15.4 %    RDW-SD 47.0 37.0 - 54.0 fl    MPV 11.8 6.0 - 12.0 fL    Platelets 217 140 - 450 10*3/mm3    Neutrophil % 38.1 (L) 42.7 - 76.0 %    Lymphocyte % 40.1 19.6 - 45.3 %    Monocyte % 11.1 5.0 - 12.0 %    Eosinophil % 9.9 (H) 0.3 - 6.2 %    Basophil % 0.8 0.0 - 1.5 %    Immature Grans % 0.0 0.0 - 0.5 %    Neutrophils, Absolute 1.89 1.70 - 7.00 10*3/mm3    Lymphocytes, Absolute 1.99 0.70 - 3.10 10*3/mm3    Monocytes, Absolute 0.55 0.10 - 0.90 10*3/mm3    Eosinophils, Absolute 0.49 (H) 0.00 - 0.40 10*3/mm3    Basophils, Absolute 0.04 0.00 - 0.20 10*3/mm3    Immature Grans, Absolute 0.00 0.00 - 0.05 10*3/mm3    nRBC 0.0 0.0 - 0.2 /100 WBC   Insulin, Total    Specimen: Blood   Result Value Ref Range    Insulin 13.7 2.6 - 24.9 uIU/mL       Procedures         ASSESSMENT/PLAN:  Problem List Items Addressed This Visit     None          FOLLOW-UP:  No follow-ups on file.      Electronically signed by:    Mitul Robles MD

## 2020-10-06 NOTE — ASSESSMENT & PLAN NOTE
Obesity is improving with treatment.  Discussed the patient's BMI.  The BMI is above average; BMI management plan is completed.  General weight loss/lifestyle modification strategies discussed (elicit support from others; identify saboteurs; non-food rewards, etc).  Informal exercise measures discussed, e.g. taking stairs instead of elevator.  Regular aerobic exercise program discussed.

## 2020-10-06 NOTE — ASSESSMENT & PLAN NOTE
Lipid abnormalities are worsening.  Nutritional counseling was provided.  Lipids will be reassessed in 6 months.

## 2020-10-06 NOTE — ASSESSMENT & PLAN NOTE
Reduce bad fats and bad carbs. Repeat labs in 5 months. If not improved proceed with US and presume fatty liver.

## 2020-10-08 ENCOUNTER — OFFICE VISIT (OUTPATIENT)
Dept: BARIATRICS/WEIGHT MGMT | Facility: CLINIC | Age: 60
End: 2020-10-08

## 2020-10-08 ENCOUNTER — DOCUMENTATION (OUTPATIENT)
Dept: BARIATRICS/WEIGHT MGMT | Facility: CLINIC | Age: 60
End: 2020-10-08

## 2020-10-08 VITALS
TEMPERATURE: 97.1 F | OXYGEN SATURATION: 98 % | RESPIRATION RATE: 18 BRPM | WEIGHT: 162 LBS | HEART RATE: 67 BPM | SYSTOLIC BLOOD PRESSURE: 108 MMHG | HEIGHT: 62 IN | BODY MASS INDEX: 29.81 KG/M2 | DIASTOLIC BLOOD PRESSURE: 62 MMHG

## 2020-10-08 DIAGNOSIS — E78.00 ELEVATED CHOLESTEROL: ICD-10-CM

## 2020-10-08 DIAGNOSIS — E66.9 OBESITY, CLASS I, BMI 30-34.9: ICD-10-CM

## 2020-10-08 DIAGNOSIS — R74.8 ELEVATED LIVER ENZYMES: Primary | ICD-10-CM

## 2020-10-08 PROCEDURE — MEDWTESTPT: Performed by: NURSE PRACTITIONER

## 2020-10-08 RX ORDER — PHENTERMINE HYDROCHLORIDE 37.5 MG/1
TABLET ORAL
Qty: 28 TABLET | Refills: 0 | Status: SHIPPED | OUTPATIENT
Start: 2020-10-08 | End: 2020-11-16

## 2020-10-08 RX ORDER — CHLORAL HYDRATE 500 MG
CAPSULE ORAL
COMMUNITY
End: 2020-10-08

## 2020-10-08 RX ORDER — MULTIPLE VITAMINS W/ MINERALS TAB 9MG-400MCG
1 TAB ORAL DAILY
COMMUNITY
End: 2020-11-16

## 2020-10-08 NOTE — PROGRESS NOTES
[facility]   [facility] [address]  [place] [address]    Name: Varsha Lockhart  : 1960  Patient Care Team:  Mitul Robles MD as PCP - General (Internal Medicine)  Mitul Robles MD as PCP - Internal Medicine (Internal Medicine)    Chief Complaint;:   Chief Complaint   Patient presents with   • Follow-up   • Obesity   • Weight Loss        HPI      Office visit for Varsha Lockhart, a 60 y.o. female, established patient with class 1 obesity, knee pain, and elevated liver enzymes, for medical weight loss. Patient is satisfied with weight loss progress. Hunger control has remain unchanged but she is getting hungry in the evening and wondering if her phentermine dosage can be increased. Finished her PT, still meeting with  twice weekly and doing strength training.  Body mass index is 29.63 kg/m².    Bk: HMR shake (24g CHO, unsure about the amount of fiber)- keeps her full and she likes it, has used for years  L: 80 calorie greek yogurt or protein bar (previously was skipping so feels like this is good progress)  D: baked salmon 2X/week, cod, strip steak, (50g protein) with salad   She discussed her lab results were discussed with Dr. Robles and understands that weight loss is treatment for fatty liver disease. He added daily fiber supplement.  The patient is exercising with a FITT score of:    Frequency   Intensity Time Strength Training   []   0 None  []   0 None  []   0 None  []   0 None    []   1 (1-2x/week) []   1 (light) []   1 (<10 min) []   1 (1x/week)   []   2 (3-5x/week) [x]   2 (moderate) []   2 (10-20 min) []   2 (2x/week)   [x]   3 (daily)   []   3 (moderately hard)  []   4 (very hard) []   3 (20-30 min)  [x]   4 (>30 min) [x]   3 (3-4x/week)       Change in weight since last visit: 3 lb loss    Recent Weight History:   Wt Readings from Last 3 Encounters:   10/08/20 73.5 kg (162 lb)   10/05/20 74.4 kg (164 lb)   20 74.8 kg (165 lb)  "    Start Weight: 171 lb  Total Loss/%: -5.26 %  Loss of BBW: 9 lb    Body composition analysis completed and showed:   BMR: 1362  %body fat:41.2 %  Total fat mass (in lbs):66.35  Fat free mass (in lbs):95.5  Total body water (in lb):70.0    VS   Vitals:    10/08/20 0859   BP: 108/62   BP Location: Left arm   Patient Position: Sitting   Cuff Size: Adult   Pulse: 67   Resp: 18   Temp: 97.1 °F (36.2 °C)   TempSrc: Temporal   SpO2: 98%   Weight: 73.5 kg (162 lb)   Height: 157.5 cm (62\")       Measurements (in inches)  Waist: 36.75    Past Medical History:   Diagnosis Date   • Depression    • Headache     negative CT scan of head   • High cholesterol    •  labor     and pre-eclampsia with loss of  child   • Strain of right Achilles tendon 2016    massage therapy and rest       Patient Active Problem List   Diagnosis   • Elevated cholesterol   • Overweight   • Preventative health care   • Neck pain   • Class 1 obesity due to excess calories without serious comorbidity with body mass index (BMI) of 31.0 to 31.9 in adult   • Elevated liver enzymes   • History of skin cancer       No Known Allergies      Current Outpatient Medications:   •  Multiple Vitamins-Minerals (multivitamin with minerals) tablet tablet, Take 1 tablet by mouth Daily., Disp: , Rfl:   •  phentermine (ADIPEX-P) 37.5 MG tablet, Take 1 tablet at 11am., Disp: 28 tablet, Rfl: 0    Physical Exam:    General:  .well developed; well nourished  no acute distress  obese    Lungs:  breathing is unlabored  clear to auscultation bilaterally   Heart:  regular rate and rhythm, S1, S2 normal, no murmur, click, rub or gallop     --Vit D levels within normal levels but not ideal. Suggest 2K iu qday to prevent deficiency- taking BA multi that includes 3K iu.   --Insulin levels are normal but above 90. Suspect some insulin resistance. Insulin level 13.   --Eosinophil elevated 9.9 - likely related to allergy, currently on loratadine per chart, will find out if " she is on this. --Antihistamines can be weight positive so consider using only during the trouble seasons.   --Free T3 and free T4 are ideal but TSH is slightly above ideal, continue to monitor.   --Liver enzymes ALT and AST are both elevated- repeat in 6 months per Dr. Robles (02/2020). U/S will be indicated if still elevated at that time.  --Total Cholesterol, HDL, LDL elevated. Low-cholesterol protein choices and weight loss should help.  With rapid weight loss, we sometimes see a physiologic shift in cholesterol levels the LDL increases as fat cells are shrinking.  This is a normal and expected phenomenon.  Expect improved cholesterol levels in 3-6 months. Follow PCP guidelines.  ASSESSMENT/PLAN:   Varsha was seen today for follow-up, obesity and weight loss.    Diagnoses and all orders for this visit:    Elevated liver enzymes  Comments:  15% loss of beginning body weight recommended for improvement (145lb). Added fiber supplement.    Obesity, Class I, BMI 30-34.9  -     phentermine (ADIPEX-P) 37.5 MG tablet; Take 1 tablet at 11am.    Elevated cholesterol  Comments:  Should improve with 5-10% loss of beginning body weight.     I have instructed the patient to continue with pursuit of medical weight loss as a part of this program. Patient does meet criteria for use of anorectics at this time as BMI >27. Continue nutritional focus and work towards new exercise FITT goal of:     Frequency   Intensity Time Strength Training   []   0 None  []   0 None  []   0 None  []   0 None    []   1 (1-2x/week) []   1 (light) []   1 (<10 min) []   1 (1x/week)   [x]   2 (3-5x/week) [x]   2 (moderate) []   2 (10-20 min) []   2 (2x/week)   []   3 (daily)   []   3 (moderately hard)  []   4 (very hard) []   3 (20-30 min)  [x]   4 (>30 min) [x]   3 (3-4x/week)       The current plan for this month includes: Keep up the great focus, start tracking net carb with food diary. Increase phentermine to whole tab. Goal 4lb.       I spent 15  minutes face to face with patient and 20 minutes were spent counseling the patient on obesity and behavior change.     Plan of care reviewed with the patient at the conclusion of today's visit. We discussed the risks, benefits, and limitations of treatments. Continue medications and OTC supplements as discussed. Patient verbalizes understanding of and agreement with management plan.      Return in about 1 month (around 11/8/2020) for Next scheduled follow up.      JAS Mayorga

## 2020-10-08 NOTE — PROGRESS NOTES
Through collaboration with Dr. Robles, repeat liver panel 2/2021. If abnormal, he plans to conduct an ultrasound of liver.

## 2020-11-03 ENCOUNTER — OFFICE VISIT (OUTPATIENT)
Dept: INTERNAL MEDICINE | Facility: CLINIC | Age: 60
End: 2020-11-03

## 2020-11-03 ENCOUNTER — LAB (OUTPATIENT)
Dept: LAB | Facility: HOSPITAL | Age: 60
End: 2020-11-03

## 2020-11-03 VITALS
TEMPERATURE: 98 F | WEIGHT: 165.8 LBS | HEIGHT: 62 IN | BODY MASS INDEX: 30.51 KG/M2 | OXYGEN SATURATION: 98 % | SYSTOLIC BLOOD PRESSURE: 128 MMHG | HEART RATE: 79 BPM | DIASTOLIC BLOOD PRESSURE: 78 MMHG

## 2020-11-03 DIAGNOSIS — R51.9 ONE-SIDED HEADACHE: ICD-10-CM

## 2020-11-03 DIAGNOSIS — R03.0 ELEVATED BLOOD PRESSURE READING: ICD-10-CM

## 2020-11-03 DIAGNOSIS — R51.9 ONE-SIDED HEADACHE: Primary | ICD-10-CM

## 2020-11-03 PROCEDURE — 86140 C-REACTIVE PROTEIN: CPT

## 2020-11-03 PROCEDURE — 99214 OFFICE O/P EST MOD 30 MIN: CPT | Performed by: PHYSICIAN ASSISTANT

## 2020-11-03 PROCEDURE — 85025 COMPLETE CBC W/AUTO DIFF WBC: CPT

## 2020-11-03 PROCEDURE — 85652 RBC SED RATE AUTOMATED: CPT

## 2020-11-03 RX ORDER — AMOXICILLIN 500 MG/1
1 CAPSULE ORAL 4 TIMES DAILY
COMMUNITY
Start: 2020-10-31 | End: 2020-11-16

## 2020-11-03 RX ORDER — PREDNISONE 20 MG/1
20 TABLET ORAL 2 TIMES DAILY
Qty: 10 TABLET | Refills: 0 | Status: SHIPPED | OUTPATIENT
Start: 2020-11-03 | End: 2020-11-08

## 2020-11-03 NOTE — PROGRESS NOTES
Patient Care Team:  Mitul Robles MD as PCP - General (Internal Medicine)  Mitul Robles MD as PCP - Internal Medicine (Internal Medicine)    Chief Complaint::   Chief Complaint   Patient presents with   • Facial Pain     L side x4 days  Dentisit put her on amoxicillin   • Headache   • Earache     started today        Subjective     HPI  Varsha is a 60 year old female who presents with new history of facial pain/headache.  She states the facial pain started ~ 4 days ago on the left side of her face.  The pain is a 6-8/10 and occurs throughout the day. She has pain along the left side of her maxillary area and the left side of her bottom teeth.  She did call her dentist and was started on amoxicillin 500mg 4 times a day.  She was seen in the dentist office on Monday and had full xrays, which did not show any abnormalities.  She has headache along the left temporal region.  She alternated between tylenol and motrin for the pain.  This does not help with the pain.  She has used hot compresses and this has helped with the pain. She denies history of migraines or TMJ.  There is no rash. She denies body aches or fever.        The following portions of the patient's history were reviewed and updated as appropriate: active problem list, medication list, allergies, family history, social history    Review of Systems:   Review of Systems   Constitutional: Negative for chills, fatigue and fever.   HENT: Positive for ear pain. Negative for congestion, dental problem, ear discharge, facial swelling, hearing loss, mouth sores, nosebleeds, postnasal drip and sinus pressure.    Eyes: Negative for blurred vision, double vision, photophobia, pain, discharge, redness, itching and visual disturbance.   Respiratory: Negative for cough, chest tightness, shortness of breath and wheezing.    Cardiovascular: Negative for chest pain and palpitations.   Gastrointestinal: Negative for abdominal pain, blood in stool and constipation.  "  Endocrine: Negative for cold intolerance, heat intolerance and polydipsia.   Musculoskeletal: Negative for arthralgias and back pain.   Skin: Negative for color change.   Allergic/Immunologic: Negative for environmental allergies and food allergies.   Neurological: Positive for headache. Negative for dizziness, facial asymmetry and speech difficulty.   Hematological: Negative for adenopathy. Does not bruise/bleed easily.   Psychiatric/Behavioral: Negative for decreased concentration, self-injury and sleep disturbance. The patient is not nervous/anxious.        Vital Signs  Vitals:    11/03/20 1424 11/03/20 1451   BP: 154/90 128/78   BP Location: Left arm    Patient Position: Sitting    Cuff Size: Adult    Pulse: 79    Temp: 98 °F (36.7 °C)    SpO2: 98%    Weight: 75.2 kg (165 lb 12.8 oz)    Height: 157.5 cm (62\")    PainSc:   6    PainLoc: Face      Body mass index is 30.33 kg/m².    Labs  Lab on 08/28/2020   Component Date Value Ref Range Status   • 1,25-Dihydroxy, Vitamin D 08/28/2020 37.6  19.9 - 79.3 pg/mL Final   • TSH 08/28/2020 2.510  0.270 - 4.200 uIU/mL Final   • Glucose 08/28/2020 91  65 - 99 mg/dL Final   • BUN 08/28/2020 18  8 - 23 mg/dL Final   • Creatinine 08/28/2020 0.81  0.57 - 1.00 mg/dL Final   • Sodium 08/28/2020 140  136 - 145 mmol/L Final   • Potassium 08/28/2020 4.5  3.5 - 5.2 mmol/L Final   • Chloride 08/28/2020 103  98 - 107 mmol/L Final   • CO2 08/28/2020 25.1  22.0 - 29.0 mmol/L Final   • Calcium 08/28/2020 9.4  8.6 - 10.5 mg/dL Final   • Total Protein 08/28/2020 7.0  6.0 - 8.5 g/dL Final   • Albumin 08/28/2020 4.20  3.50 - 5.20 g/dL Final   • ALT (SGPT) 08/28/2020 96* 1 - 33 U/L Final   • AST (SGOT) 08/28/2020 60* 1 - 32 U/L Final   • Alkaline Phosphatase 08/28/2020 50  39 - 117 U/L Final   • Total Bilirubin 08/28/2020 0.7  0.0 - 1.2 mg/dL Final   • eGFR Non African Amer 08/28/2020 72  >60 mL/min/1.73 Final   • Globulin 08/28/2020 2.8  gm/dL Final   • A/G Ratio 08/28/2020 1.5  g/dL Final "   • BUN/Creatinine Ratio 08/28/2020 22.2  7.0 - 25.0 Final   • Anion Gap 08/28/2020 11.9  5.0 - 15.0 mmol/L Final   • Total Cholesterol 08/28/2020 260* 0 - 200 mg/dL Final   • Triglycerides 08/28/2020 87  0 - 150 mg/dL Final   • HDL Cholesterol 08/28/2020 77* 40 - 60 mg/dL Final   • LDL Cholesterol  08/28/2020 166* 0 - 100 mg/dL Final   • VLDL Cholesterol 08/28/2020 17.4  5 - 40 mg/dL Final   • LDL/HDL Ratio 08/28/2020 2.15   Final   • Free T4 08/28/2020 1.18  0.93 - 1.70 ng/dL Final   • T3, Free 08/28/2020 3.46  2.00 - 4.40 pg/mL Final   • WBC 08/28/2020 4.96  3.40 - 10.80 10*3/mm3 Final   • RBC 08/28/2020 4.79  3.77 - 5.28 10*6/mm3 Final   • Hemoglobin 08/28/2020 15.8  12.0 - 15.9 g/dL Final   • Hematocrit 08/28/2020 47.8* 34.0 - 46.6 % Final   • MCV 08/28/2020 99.8* 79.0 - 97.0 fL Final   • MCH 08/28/2020 33.0  26.6 - 33.0 pg Final   • MCHC 08/28/2020 33.1  31.5 - 35.7 g/dL Final   • RDW 08/28/2020 12.6  12.3 - 15.4 % Final   • RDW-SD 08/28/2020 47.0  37.0 - 54.0 fl Final   • MPV 08/28/2020 11.8  6.0 - 12.0 fL Final   • Platelets 08/28/2020 217  140 - 450 10*3/mm3 Final   • Neutrophil % 08/28/2020 38.1* 42.7 - 76.0 % Final   • Lymphocyte % 08/28/2020 40.1  19.6 - 45.3 % Final   • Monocyte % 08/28/2020 11.1  5.0 - 12.0 % Final   • Eosinophil % 08/28/2020 9.9* 0.3 - 6.2 % Final   • Basophil % 08/28/2020 0.8  0.0 - 1.5 % Final   • Immature Grans % 08/28/2020 0.0  0.0 - 0.5 % Final   • Neutrophils, Absolute 08/28/2020 1.89  1.70 - 7.00 10*3/mm3 Final   • Lymphocytes, Absolute 08/28/2020 1.99  0.70 - 3.10 10*3/mm3 Final   • Monocytes, Absolute 08/28/2020 0.55  0.10 - 0.90 10*3/mm3 Final   • Eosinophils, Absolute 08/28/2020 0.49* 0.00 - 0.40 10*3/mm3 Final   • Basophils, Absolute 08/28/2020 0.04  0.00 - 0.20 10*3/mm3 Final   • Immature Grans, Absolute 08/28/2020 0.00  0.00 - 0.05 10*3/mm3 Final   • nRBC 08/28/2020 0.0  0.0 - 0.2 /100 WBC Final   • Insulin 08/28/2020 13.7  2.6 - 24.9 uIU/mL Final       Imaging  No  radiology results for the last 30 days.      Current Outpatient Medications:   •  Multiple Vitamins-Minerals (multivitamin with minerals) tablet tablet, Take 1 tablet by mouth Daily., Disp: , Rfl:   •  phentermine (ADIPEX-P) 37.5 MG tablet, Take 1 tablet at 11am., Disp: 28 tablet, Rfl: 0  •  amoxicillin (AMOXIL) 500 MG capsule, Take 1 capsule by mouth 4 (Four) Times a Day., Disp: , Rfl:   •  predniSONE (DELTASONE) 20 MG tablet, Take 1 tablet by mouth 2 (Two) Times a Day for 5 days., Disp: 10 tablet, Rfl: 0    Physical Exam:    Physical Exam  Vitals signs and nursing note reviewed.   Constitutional:       Appearance: She is normal weight.   HENT:      Head: Normocephalic and atraumatic.      Right Ear: Tympanic membrane, ear canal and external ear normal.      Left Ear: Tympanic membrane and external ear normal.      Nose: Nose normal.   Eyes:      Extraocular Movements: Extraocular movements intact.      Conjunctiva/sclera: Conjunctivae normal.      Pupils: Pupils are equal, round, and reactive to light.   Neck:      Musculoskeletal: Normal range of motion and neck supple. No neck rigidity or muscular tenderness.      Vascular: No carotid bruit.   Cardiovascular:      Rate and Rhythm: Normal rate and regular rhythm.      Pulses: Normal pulses.      Heart sounds: Normal heart sounds.   Pulmonary:      Breath sounds: Normal breath sounds.   Lymphadenopathy:      Cervical: No cervical adenopathy.   Neurological:      General: No focal deficit present.      Mental Status: She is alert and oriented to person, place, and time. Mental status is at baseline.   Psychiatric:         Mood and Affect: Mood normal.         Behavior: Behavior normal.         Thought Content: Thought content normal.         Judgment: Judgment normal.         Procedures        Assessment/Plan   Problem List Items Addressed This Visit        Cardiovascular and Mediastinum    Elevated blood pressure reading    Overview     May be related to increase  in pain.  Discontinue phentermine for now.  Continue to check BP at home.            Other    One-sided headache - Primary    Overview     Prednisone 20mg BID x 5 days initiated.  Inflammatory markers and CBC ordered today.  Will contact patient with lab results in the am and dispo accordingly.  Continue heat.         Relevant Medications    predniSONE (DELTASONE) 20 MG tablet    Other Relevant Orders    CBC & Differential    Sedimentation Rate    C-reactive Protein          Return in about 3 days (around 11/6/2020), or if symptoms worsen or fail to improve.    Plan of care reviewed with patient at the conclusion of today's visit. Education was provided regarding diagnosis, management, and any prescribed or recommended OTC medications.Patient verbalizes understanding of and agreement with management plan.       Pamela Cartagena PA-C    Please note that portions of this note were completed with a voice recognition program. Efforts were made to edit the dictations, but occasionally words are mistranscribed.

## 2020-11-04 ENCOUNTER — TELEMEDICINE (OUTPATIENT)
Dept: INTERNAL MEDICINE | Facility: CLINIC | Age: 60
End: 2020-11-04

## 2020-11-04 DIAGNOSIS — G50.0 TRIGEMINAL NEURALGIA: Primary | ICD-10-CM

## 2020-11-04 DIAGNOSIS — M79.675 GREAT TOE PAIN, LEFT: ICD-10-CM

## 2020-11-04 DIAGNOSIS — R51.9 ONE-SIDED HEADACHE: ICD-10-CM

## 2020-11-04 DIAGNOSIS — Z72.820 LACK OF ADEQUATE SLEEP: ICD-10-CM

## 2020-11-04 LAB
BASOPHILS # BLD AUTO: 0.04 10*3/MM3 (ref 0–0.2)
BASOPHILS NFR BLD AUTO: 0.7 % (ref 0–1.5)
CRP SERPL-MCNC: 0.11 MG/DL (ref 0–0.5)
DEPRECATED RDW RBC AUTO: 42.7 FL (ref 37–54)
EOSINOPHIL # BLD AUTO: 0.47 10*3/MM3 (ref 0–0.4)
EOSINOPHIL NFR BLD AUTO: 7.7 % (ref 0.3–6.2)
ERYTHROCYTE [DISTWIDTH] IN BLOOD BY AUTOMATED COUNT: 11.9 % (ref 12.3–15.4)
ERYTHROCYTE [SEDIMENTATION RATE] IN BLOOD: 1 MM/HR (ref 0–30)
HCT VFR BLD AUTO: 49.2 % (ref 34–46.6)
HGB BLD-MCNC: 16.8 G/DL (ref 12–15.9)
IMM GRANULOCYTES # BLD AUTO: 0.02 10*3/MM3 (ref 0–0.05)
IMM GRANULOCYTES NFR BLD AUTO: 0.3 % (ref 0–0.5)
LYMPHOCYTES # BLD AUTO: 2.28 10*3/MM3 (ref 0.7–3.1)
LYMPHOCYTES NFR BLD AUTO: 37.4 % (ref 19.6–45.3)
MCH RBC QN AUTO: 33 PG (ref 26.6–33)
MCHC RBC AUTO-ENTMCNC: 34.1 G/DL (ref 31.5–35.7)
MCV RBC AUTO: 96.7 FL (ref 79–97)
MONOCYTES # BLD AUTO: 0.48 10*3/MM3 (ref 0.1–0.9)
MONOCYTES NFR BLD AUTO: 7.9 % (ref 5–12)
NEUTROPHILS NFR BLD AUTO: 2.81 10*3/MM3 (ref 1.7–7)
NEUTROPHILS NFR BLD AUTO: 46 % (ref 42.7–76)
NRBC BLD AUTO-RTO: 0 /100 WBC (ref 0–0.2)
PLATELET # BLD AUTO: 232 10*3/MM3 (ref 140–450)
PMV BLD AUTO: 11.9 FL (ref 6–12)
RBC # BLD AUTO: 5.09 10*6/MM3 (ref 3.77–5.28)
WBC # BLD AUTO: 6.1 10*3/MM3 (ref 3.4–10.8)

## 2020-11-04 PROCEDURE — 99214 OFFICE O/P EST MOD 30 MIN: CPT | Performed by: INTERNAL MEDICINE

## 2020-11-04 RX ORDER — GABAPENTIN 100 MG/1
CAPSULE ORAL
Qty: 180 CAPSULE | Refills: 1 | Status: SHIPPED | OUTPATIENT
Start: 2020-11-04 | End: 2021-03-05 | Stop reason: SDUPTHER

## 2020-11-04 RX ORDER — TRAMADOL HYDROCHLORIDE 50 MG/1
50 TABLET ORAL EVERY 6 HOURS PRN
Qty: 60 TABLET | Refills: 1 | Status: SHIPPED | OUTPATIENT
Start: 2020-11-04 | End: 2020-11-16

## 2020-11-04 NOTE — ASSESSMENT & PLAN NOTE
Acute issue and will change the prednisone to 2 tabs in am and start gabapentin and ultram for her left sided headache. Her inflammatory markers were very low and therefore do not think this is an atypical temporal arteritis. Refer to Neurology.

## 2020-11-04 NOTE — PROGRESS NOTES
Chief Complaint   Patient presents with   • Headache   she agreed yes to consent for face time video visit with covid issues This was an audio and video enabled telemedicine encounter.    History of Present Illness  60 y.o. white female presents for 1 week or so of headache. It became more severe on the left side of the face. She denies loss of taste or weakness or change in vision. She has seen the dentist and started on amoxicillin but no signs of dental issue. She has started on prednisone. Her headache persist and has not been able to rest as the pain is worse with lying down.     Review of Systems   Constitutional: Negative for chills and fever.   HENT: Positive for sinus pressure. Negative for sore throat.    Respiratory: Negative for cough and shortness of breath.    Cardiovascular: Negative for chest pain and palpitations.   Gastrointestinal: Negative for abdominal pain, nausea and vomiting.   Skin: Negative for rash.   Neurological: Positive for headaches. Negative for dizziness, light-headedness and numbness.   Hematological: Negative for adenopathy.   Psychiatric/Behavioral: Positive for sleep disturbance.   All other systems reviewed and are negative.    All other ROS reviewed and negative.    PMSFH:  The following portions of the patient's history were reviewed and updated as appropriate: allergies, current medications, past family history, past medical history, past social history, past surgical history and problem list.      Current Outpatient Medications:   •  amoxicillin-clavulanate (Augmentin) 875-125 MG per tablet, Take 1 tablet by mouth 2 (Two) Times a Day., Disp: 14 tablet, Rfl: 0  •  gabapentin (NEURONTIN) 100 MG capsule, One tab in am and afternoon and 3 tabs in the evening for headache (Patient taking differently: 100 mg 3 (Three) Times a Day. One tab in am and afternoon and 3 tabs in the evening for headache), Disp: 180 capsule, Rfl: 1    VITALS:  There were no vitals taken for this  visit.    Physical Exam  Constitutional:       Appearance: Normal appearance.   Neurological:      General: No focal deficit present.      Mental Status: She is alert and oriented to person, place, and time.      Motor: No weakness.   Psychiatric:         Mood and Affect: Mood normal.         Behavior: Behavior normal.         LABS:  Results for orders placed or performed in visit on 11/03/20   Sedimentation Rate    Specimen: Blood   Result Value Ref Range    Sed Rate 1 0 - 30 mm/hr   C-reactive Protein    Specimen: Blood   Result Value Ref Range    C-Reactive Protein 0.11 0.00 - 0.50 mg/dL   CBC Auto Differential    Specimen: Blood   Result Value Ref Range    WBC 6.10 3.40 - 10.80 10*3/mm3    RBC 5.09 3.77 - 5.28 10*6/mm3    Hemoglobin 16.8 (H) 12.0 - 15.9 g/dL    Hematocrit 49.2 (H) 34.0 - 46.6 %    MCV 96.7 79.0 - 97.0 fL    MCH 33.0 26.6 - 33.0 pg    MCHC 34.1 31.5 - 35.7 g/dL    RDW 11.9 (L) 12.3 - 15.4 %    RDW-SD 42.7 37.0 - 54.0 fl    MPV 11.9 6.0 - 12.0 fL    Platelets 232 140 - 450 10*3/mm3    Neutrophil % 46.0 42.7 - 76.0 %    Lymphocyte % 37.4 19.6 - 45.3 %    Monocyte % 7.9 5.0 - 12.0 %    Eosinophil % 7.7 (H) 0.3 - 6.2 %    Basophil % 0.7 0.0 - 1.5 %    Immature Grans % 0.3 0.0 - 0.5 %    Neutrophils, Absolute 2.81 1.70 - 7.00 10*3/mm3    Lymphocytes, Absolute 2.28 0.70 - 3.10 10*3/mm3    Monocytes, Absolute 0.48 0.10 - 0.90 10*3/mm3    Eosinophils, Absolute 0.47 (H) 0.00 - 0.40 10*3/mm3    Basophils, Absolute 0.04 0.00 - 0.20 10*3/mm3    Immature Grans, Absolute 0.02 0.00 - 0.05 10*3/mm3    nRBC 0.0 0.0 - 0.2 /100 WBC       Procedures         ASSESSMENT/PLAN:  Problems Addressed this Visit        Nervous and Auditory    Trigeminal neuralgia - Primary     Acute issue and will change the prednisone to 2 tabs in am and start gabapentin and ultram for her left sided headache. Her inflammatory markers were very low and therefore do not think this is an atypical temporal arteritis. Refer to Neurology.           Relevant Orders    Ambulatory Referral to Neurology       Other    One-sided headache     Presumed trigmeinal neuralgia and not Bell's Pasly or temporal artertis but will have Neurology see for management and rule out migraine or cluster headache.          Relevant Orders    Ambulatory Referral to Neurology      Other Visit Diagnoses     Lack of adequate sleep        Use the gabapentin to help.     Great toe pain, left        Left great toe pain and weakness and negative EMG test. Proceed with expert evaluation. Addendum 12/9/2020      Diagnoses       Codes Comments    Trigeminal neuralgia    -  Primary ICD-10-CM: G50.0  ICD-9-CM: 350.1     Lack of adequate sleep     ICD-10-CM: Z72.820  ICD-9-CM: V69.4 Use the gabapentin to help.     One-sided headache     ICD-10-CM: R51.9  ICD-9-CM: 784.0 Presumed trigmeinal neuralgia and not Bell's Pasly or temporal artertis but will have Neurology see for management and rule out migraine or cluster headache.     Great toe pain, left     ICD-10-CM: M79.675  ICD-9-CM: 729.5 Left great toe pain and weakness and negative EMG test. Proceed with expert evaluation. Addendum 12/9/2020          FOLLOW-UP:  Return in about 1 month (around 12/4/2020) for Recheck.      Electronically signed by:    Mitul Robles MD

## 2020-11-04 NOTE — ASSESSMENT & PLAN NOTE
Presumed trigmeinal neuralgia and not Bell's Pasly or temporal artertis but will have Neurology see for management and rule out migraine or cluster headache.

## 2020-11-05 ENCOUNTER — TELEPHONE (OUTPATIENT)
Dept: INTERNAL MEDICINE | Facility: CLINIC | Age: 60
End: 2020-11-05

## 2020-11-05 NOTE — TELEPHONE ENCOUNTER
Pt called stating that [facility] pharmacy told her that her prescription for tramadol had been denied and that she will need a  PA.    In the mean time patient is in unbearable pain and she needs to know what she can take until this is solved.

## 2020-11-16 ENCOUNTER — OFFICE VISIT (OUTPATIENT)
Dept: NEUROLOGY | Facility: CLINIC | Age: 60
End: 2020-11-16

## 2020-11-16 VITALS
HEIGHT: 62 IN | BODY MASS INDEX: 30.73 KG/M2 | OXYGEN SATURATION: 98 % | DIASTOLIC BLOOD PRESSURE: 80 MMHG | HEART RATE: 73 BPM | WEIGHT: 167 LBS | SYSTOLIC BLOOD PRESSURE: 128 MMHG | TEMPERATURE: 96.8 F

## 2020-11-16 DIAGNOSIS — R29.898 WEAKNESS OF LEFT LOWER EXTREMITY: ICD-10-CM

## 2020-11-16 DIAGNOSIS — G50.0 TRIGEMINAL NEURALGIA OF LEFT SIDE OF FACE: Primary | ICD-10-CM

## 2020-11-16 LAB
ANION GAP SERPL CALCULATED.3IONS-SCNC: 7.4 MMOL/L (ref 5–15)
BUN SERPL-MCNC: 14 MG/DL (ref 8–23)
BUN/CREAT SERPL: 18.7 (ref 7–25)
CALCIUM SPEC-SCNC: 9.5 MG/DL (ref 8.6–10.5)
CHLORIDE SERPL-SCNC: 103 MMOL/L (ref 98–107)
CO2 SERPL-SCNC: 30.6 MMOL/L (ref 22–29)
CREAT SERPL-MCNC: 0.75 MG/DL (ref 0.57–1)
GFR SERPL CREATININE-BSD FRML MDRD: 78 ML/MIN/1.73
GLUCOSE SERPL-MCNC: 105 MG/DL (ref 65–99)
POTASSIUM SERPL-SCNC: 4.4 MMOL/L (ref 3.5–5.2)
SODIUM SERPL-SCNC: 141 MMOL/L (ref 136–145)

## 2020-11-16 PROCEDURE — 80048 BASIC METABOLIC PNL TOTAL CA: CPT | Performed by: NURSE PRACTITIONER

## 2020-11-16 PROCEDURE — 99215 OFFICE O/P EST HI 40 MIN: CPT | Performed by: NURSE PRACTITIONER

## 2020-11-16 PROCEDURE — 36415 COLL VENOUS BLD VENIPUNCTURE: CPT | Performed by: NURSE PRACTITIONER

## 2020-11-16 NOTE — PROGRESS NOTES
Subjective:     Patient ID: Varsha Lockhart is a 60 y.o. female.    CC:   Chief Complaint   Patient presents with   • Trigeminal Neuralgia       HPI:   History of Present Illness     Ms Lockhart is a very pleasant 60-year-old female here today for initial neurological evaluation with complaints of left facial pain. She states she noticed facial pain starting on 2020-10-30, the week prior she had a very low-grade headache which was nothing to bothersome to her. She states the pain in her face was fairly significant and severe, along the upper and lower jaw and around her left orbit. She called her dentist on that Saturday who called her in amoxicillin, she saw him in the office on Monday. All x-rays were normal at that time. She then saw primary care who started her on prednisone, there was no improvement in her symptoms and eventually gabapentin was started. She states that as soon as she started the gabapentin her pain subsided. She is currently taking 100 mg in the afternoon and 200 mg at night, she has been completely pain-free since starting the gabapentin.  Primary care did obtain inflammatory markers including CRP and sed rate which were very low. She had no associated vision change.  She has had no recent infections and no head injuries.  She gets occasional mild headaches but nothing too serious.  She denies any weakness in her arms or legs although she states since June she has been unable to bend her left great toe which is very unusual. Around that time she did have a knee injury, she has been in physical therapy for that. She adamantly denies any numbness or tingling in her foot, toe or leg.  She has had no foot drop or foot drag.  She denies back pain, urinary or bowel incontinence.  She also denies dizziness, vision changes or any other complaints today.  The following portions of the patient's history were reviewed and updated as appropriate: allergies, current medications, past family history, past  medical history, past social history, past surgical history and problem list.    Past Medical History:   Diagnosis Date   • Depression    • Headache     negative CT scan of head   • High cholesterol    •  labor     and pre-eclampsia with loss of  child   • Strain of right Achilles tendon 2016    massage therapy and rest   • Trigeminal neuralgia 2020 left side       Past Surgical History:   Procedure Laterality Date   •  SECTION       and    • THUMB ARTHROSCOPY Right     Chipped Bone   • TUBAL ABDOMINAL LIGATION         Social History     Socioeconomic History   • Marital status:      Spouse name: Not on file   • Number of children: Not on file   • Years of education: Not on file   • Highest education level: Not on file   Tobacco Use   • Smoking status: Never Smoker   • Smokeless tobacco: Never Used   Substance and Sexual Activity   • Alcohol use: Never     Frequency: Never   • Drug use: Never   • Sexual activity: Defer       Family History   Problem Relation Age of Onset   • Coronary artery disease Maternal Grandmother    • Diabetes type II Maternal Grandmother    • Diabetes Maternal Grandmother    • Ulcerative colitis Other    • Diabetes Mother    • Hypothyroidism Mother    • Hyperlipidemia Father    • Breast cancer Sister    • Hypothyroidism Sister    • Diabetes Paternal Grandmother         Review of Systems   HENT: Negative.    Eyes: Negative.    Respiratory: Negative.    Cardiovascular: Negative.    Gastrointestinal: Negative.    Endocrine: Negative.    Genitourinary: Negative.    Musculoskeletal:        She has had some recent left knee issues, she is in physical therapy   Skin: Negative.    Allergic/Immunologic: Negative.    Neurological: Positive for weakness (left great toe). Negative for dizziness, tremors, seizures, syncope, facial asymmetry, speech difficulty, light-headedness, numbness and headaches.        Positive for left facial pain  "consistent with trigeminal neuralgia   Hematological: Negative.    Psychiatric/Behavioral: Negative.         Objective:  /80   Pulse 73   Temp 96.8 °F (36 °C)   Ht 157.5 cm (62\")   Wt 75.8 kg (167 lb)   SpO2 98%   BMI 30.54 kg/m²     Neurologic Exam     Mental Status   Oriented to person, place, and time.   Attention: normal. Concentration: normal.   Speech: speech is normal   Level of consciousness: alert  Knowledge: consistent with education.   Able to read. Able to write. Normal comprehension.     Cranial Nerves   Cranial nerves II through XII intact.     CN II   Visual fields full to confrontation.   Right visual field deficit: none  Left visual field deficit: none     CN III, IV, VI   Pupils are equal, round, and reactive to light.  Right pupil: Shape: regular. Reactivity: brisk. Consensual response: intact. Accommodation: intact.   Left pupil: Shape: regular. Reactivity: brisk. Consensual response: intact. Accommodation: intact.   CN III: no CN III palsy  CN VI: no CN VI palsy  Nystagmus: none   Upgaze: normal  Downgaze: normal    CN V   Facial sensation intact.     CN VII   Facial expression full, symmetric.     CN VIII   CN VIII normal.     CN IX, X   CN IX normal.   CN X normal.     CN XI   CN XI normal.     CN XII   CN XII normal.   She has full sensation of the face although she has a slight decreased sensation to cold touch on the left upper aspect of her face. Intact pinprick sensation       Motor Exam   Muscle bulk: normal  Overall muscle tone: normal  Right arm tone: normal  Left arm tone: normal  Right arm pronator drift: absent  Left arm pronator drift: absent  Right leg tone: normal  Left leg tone: normal    Strength   Strength 5/5 throughout. She has no great toe weakness per se but she is unable to bend her left great toe, I am able to passively bend the toe. She has full sensation to pinprick and vibratory sensation of the left great toe as well       Sensory Exam   Light touch " normal.   Vibration normal.   Pinprick normal.     Gait, Coordination, and Reflexes     Gait  Gait: normal    Coordination   Romberg: negative  Finger to nose coordination: normal  Heel to shin coordination: normal  Tandem walking coordination: normal    Tremor   Resting tremor: absent  Intention tremor: absent  Action tremor: absent    Reflexes   Reflexes 2+ except as noted.   Right plantar: normal  Left plantar: normal  Right Mcnulty: absent  Left Mcnulty: absent      Physical Exam  Vitals signs reviewed.   Constitutional:       General: She is not in acute distress.     Appearance: She is well-developed. She is not ill-appearing or toxic-appearing.   HENT:      Head: Normocephalic and atraumatic.      Mouth/Throat:      Mouth: Mucous membranes are moist.   Eyes:      General: No scleral icterus.     Extraocular Movements: Extraocular movements intact.      Conjunctiva/sclera: Conjunctivae normal.      Pupils: Pupils are equal, round, and reactive to light.   Neck:      Musculoskeletal: Normal range of motion and neck supple.   Pulmonary:      Effort: Pulmonary effort is normal. No respiratory distress.   Skin:     General: Skin is warm.      Capillary Refill: Capillary refill takes less than 2 seconds.   Neurological:      Mental Status: She is alert and oriented to person, place, and time.      Coordination: Finger-Nose-Finger Test, Heel to Shin Test and Romberg Test normal.      Gait: Gait is intact. Tandem walk normal.      Deep Tendon Reflexes: Strength normal.   Psychiatric:         Mood and Affect: Mood normal.         Speech: Speech normal.         Behavior: Behavior normal.         Thought Content: Thought content normal.         Judgment: Judgment normal.         Assessment/Plan:       Diagnoses and all orders for this visit:    1. Trigeminal neuralgia of left side of face (Primary)  -     MRI Brain With & Without Contrast  -     Basic Metabolic Panel; Future  -     Basic Metabolic Panel    2. Weakness  of left lower extremity  -     EMG & Nerve Conduction Test; Future    This patient is here for evaluation of left facial pain consistent with trigeminal neuralgia. Pain has responded very well to low-dose gabapentin, she is currently taking 100 mg at noon with 200 mg at night, prescribed through PCP. She has been pain-free since starting the medicine. She will continue current dose, if she needs upward titration this can be done through either my self or her primary care. I would like to get an MRI of the brain to rule out compressive tumor or lesion contributing to her symptoms.  She is also complaining of the inability to bend her left great toe. She has full intact sensation to light touch, vibratory touch and pinprick sensation of the left great toe. No weakness on exam. I suspect this may be some degree of peroneal neuropathy as she had some type of knee injury a few months ago as well. Will check EMG of the lower extremity  We will contact her with results of the MRI and EMG as they are received. We will follow up with her in the clinic here in 4 to 6 weeks or sooner if needed. I did advise her should she need an increase in her gabapentin she may contact either my office or PCPs office.  She will also call me with any questions concerns or problems prior to her follow-up appointment     Total time of visit face-to-face 40 minutes with greater than 30 minutes spent discussion and counseling on plan of care, differential diagnosis and upcoming tests    Reviewed medications, potential side effects and signs and symptoms to report. Discussed risk versus benefits of treatment plan with patient and/or family-including medications, labs and radiology that may be ordered. Addressed questions and concerns during visit. Patient and/or family verbalized understanding and agree with plan.    AS THE PROVIDER, I PERSONALLY WORE PPE DURING ENTIRE FACE TO FACE ENCOUNTER IN CLINIC WITH THE PATIENT. PATIENT ALSO WORE PPE  DURING ENTIRE FACE TO FACE ENCOUNTER EXCEPT FOR A MAX OF 30 SECONDS DURING NEUROLOGICAL EVALUATION OF CRANIAL NERVES AND THEN MASK WAS PLACED BACK OVER PATIENT FACE FOR REMAINDER OF VISIT. I WASHED MY HANDS BEFORE AND AFTER VISIT.    During this visit the following were done:  Labs Reviewed [x]     Labs Ordered [x]    Radiology Reports Reviewed []    Radiology Ordered [x]    PCP Records Reviewed [x]   PCP notes and labs personally reviewed prior to appointment  Referring Provider Records Reviewed []    ER Records Reviewed []    Hospital Records Reviewed []    History Obtained From Family []    Radiology Images Reviewed []    Other Reviewed []    Records Requested []      Renita Cardenas, APRN  11/16/2020

## 2020-12-03 ENCOUNTER — HOSPITAL ENCOUNTER (OUTPATIENT)
Dept: MRI IMAGING | Facility: HOSPITAL | Age: 60
Discharge: HOME | End: 2020-12-03
Admitting: NURSE PRACTITIONER

## 2020-12-03 PROCEDURE — A9577 INJ MULTIHANCE: HCPCS | Performed by: NURSE PRACTITIONER

## 2020-12-03 PROCEDURE — 0 GADOBENATE DIMEGLUMINE 529 MG/ML SOLUTION: Performed by: NURSE PRACTITIONER

## 2020-12-03 PROCEDURE — 70553 MRI BRAIN STEM W/O & W/DYE: CPT

## 2020-12-03 RX ADMIN — GADOBENATE DIMEGLUMINE 15 ML: 529 INJECTION, SOLUTION INTRAVENOUS at 10:35

## 2020-12-07 RX ORDER — AMOXICILLIN AND CLAVULANATE POTASSIUM 875; 125 MG/1; MG/1
1 TABLET, FILM COATED ORAL 2 TIMES DAILY
Qty: 14 TABLET | Refills: 0 | Status: SHIPPED | OUTPATIENT
Start: 2020-12-07 | End: 2021-01-12

## 2020-12-08 ENCOUNTER — TELEPHONE (OUTPATIENT)
Dept: NEUROLOGY | Facility: CLINIC | Age: 60
End: 2020-12-08

## 2020-12-08 NOTE — TELEPHONE ENCOUNTER
JASMIN NOTIFIED OF MRI BRAIN. SHE DOES HAVE SOME PAIN AND HEADACHE ON THE LEFT SIDE. SHE WILL GO GET THE ANTIBIOTIC. SHE HAS HER EMG SCHEDULED FOR TOMORROW   SHE WOULD LIKE TO SEE ENT. DR SANTACRUZ IF POSSIBLE

## 2020-12-08 NOTE — TELEPHONE ENCOUNTER
----- Message from JAS Clemons sent at 12/7/2020  7:58 AM EST -----  Please let patient know MRI of the brain read as stable per radiology.  There was evidence of some sinus thickening and some fluid on the right mastoid bone which is typically sinus/ear related.  This is on the left side.  I am going to send her in an antibiotic but she needs to see ENT, has she ever seen ENT before?  Does she have a preference on who she sees?

## 2020-12-09 ENCOUNTER — HOSPITAL ENCOUNTER (OUTPATIENT)
Dept: NEUROLOGY | Facility: HOSPITAL | Age: 60
Discharge: HOME | End: 2020-12-09
Admitting: NURSE PRACTITIONER

## 2020-12-09 DIAGNOSIS — R29.898 WEAKNESS OF LEFT LOWER EXTREMITY: Primary | ICD-10-CM

## 2020-12-09 DIAGNOSIS — H70.90 MASTOIDITIS, UNSPECIFIED LATERALITY: Primary | ICD-10-CM

## 2020-12-09 DIAGNOSIS — R29.898 WEAKNESS OF LEFT LOWER EXTREMITY: ICD-10-CM

## 2020-12-09 PROCEDURE — 95886 MUSC TEST DONE W/N TEST COMP: CPT | Performed by: PSYCHIATRY & NEUROLOGY

## 2020-12-09 PROCEDURE — 95886 MUSC TEST DONE W/N TEST COMP: CPT

## 2020-12-09 PROCEDURE — 95908 NRV CNDJ TST 3-4 STUDIES: CPT | Performed by: PSYCHIATRY & NEUROLOGY

## 2020-12-09 PROCEDURE — 95908 NRV CNDJ TST 3-4 STUDIES: CPT

## 2020-12-09 NOTE — PROGRESS NOTES
Please let pt know her nerve and muscle study read as normal  If she would like to see ortho for her toe I can refer

## 2020-12-14 ENCOUNTER — OFFICE VISIT (OUTPATIENT)
Dept: ORTHOPEDIC SURGERY | Facility: CLINIC | Age: 60
End: 2020-12-14

## 2020-12-14 VITALS — OXYGEN SATURATION: 97 % | HEIGHT: 62 IN | BODY MASS INDEX: 30.75 KG/M2 | WEIGHT: 167.11 LBS | HEART RATE: 78 BPM

## 2020-12-14 DIAGNOSIS — M77.52 TENDINITIS OF LEFT FLEXOR HALLUCIS LONGUS: ICD-10-CM

## 2020-12-14 DIAGNOSIS — M25.675 JOINT STIFFNESS OF TOE OF LEFT FOOT: ICD-10-CM

## 2020-12-14 DIAGNOSIS — M79.672 LEFT FOOT PAIN: Primary | ICD-10-CM

## 2020-12-14 PROCEDURE — 99204 OFFICE O/P NEW MOD 45 MIN: CPT | Performed by: ORTHOPAEDIC SURGERY

## 2020-12-14 NOTE — PROGRESS NOTES
NEW PATIENT    Patient: Varsha Lockhart  : 1960    Primary Care Provider: Mitul Robles MD    Requesting Provider: As above    Pain of the Left Foot      History    Chief Complaint: Abnormality left great toe    History of Present Illness: This is a 60-year-old woman here with an unusual problem.  In  of this past year she went back to playing tennis after a 6-month hiatus, and developed pain in her left knee.  She does not remember any specific injury, no blow to the knee.  At the same time she noticed that she could not plantar flex the left great toe as much as the right.  She reports it was only mildly sore at the time.  She does not remember any specific injury to the foot.  It has remained the same over the past months.  She has developed some mild pain under the first metatarsal head, but does not notice any swelling no other obvious abnormalities.  She has not noticed any numbness.  She had EMG and nerve conduction studies done 2020, I reviewed those, they are unremarkable.  She has not noticed any particular change in gait, she has not noticed any decrease in pushoff.  She has tried massage without much change.  She works as a  for the tennis [employer].  She does wear good shoes, she does not have custom orthotics.    Current Outpatient Medications on File Prior to Visit   Medication Sig Dispense Refill   • amoxicillin-clavulanate (Augmentin) 875-125 MG per tablet Take 1 tablet by mouth 2 (Two) Times a Day. 14 tablet 0   • gabapentin (NEURONTIN) 100 MG capsule One tab in am and afternoon and 3 tabs in the evening for headache (Patient taking differently: 100 mg 3 (Three) Times a Day. One tab in am and afternoon and 3 tabs in the evening for headache) 180 capsule 1     No current facility-administered medications on file prior to visit.       No Known Allergies   Past Medical History:   Diagnosis Date   • Depression    • Headache     negative CT scan of head  "  • High cholesterol    •  labor     and pre-eclampsia with loss of  child   • Strain of right Achilles tendon 2016    massage therapy and rest   • Trigeminal neuralgia 2020 left side     Past Surgical History:   Procedure Laterality Date   •  SECTION       and    • THUMB ARTHROSCOPY Right 1975    Chipped Bone   • TUBAL ABDOMINAL LIGATION       Family History   Problem Relation Age of Onset   • Coronary artery disease Maternal Grandmother    • Diabetes type II Maternal Grandmother    • Diabetes Maternal Grandmother    • Ulcerative colitis Other    • Diabetes Mother    • Hypothyroidism Mother    • Hyperlipidemia Father    • Breast cancer Sister    • Hypothyroidism Sister    • Diabetes Paternal Grandmother       Social History     Socioeconomic History   • Marital status:      Spouse name: Not on file   • Number of children: Not on file   • Years of education: Not on file   • Highest education level: Not on file   Tobacco Use   • Smoking status: Never Smoker   • Smokeless tobacco: Never Used   Substance and Sexual Activity   • Alcohol use: Never     Frequency: Never   • Drug use: Never   • Sexual activity: Defer        Review of Systems   Constitutional: Negative.    HENT: Negative.    Eyes: Negative.    Respiratory: Negative.    Cardiovascular: Negative.    Gastrointestinal: Negative.    Endocrine: Negative.    Genitourinary: Negative.    Musculoskeletal: Positive for arthralgias.   Skin: Negative.    Allergic/Immunologic: Negative.    Neurological: Negative.    Hematological: Negative.    Psychiatric/Behavioral: Negative.        The following portions of the patient's history were reviewed and updated as appropriate: allergies, current medications, past family history, past medical history, past social history, past surgical history and problem list.    Physical Exam:   Pulse 78   Ht 157.5 cm (62.01\")   Wt 75.8 kg (167 lb 1.7 oz)   SpO2 97%   BMI 30.56 kg/m² "   GENERAL: Body habitus: overweight    Lower extremity edema: Right: none; Left: none    Varicose veins:  Right: none; Left: none    Gait: normal     Mental Status:  awake and alert; oriented to person, place, and time    Voice:  clear  SKIN:  Lower extremity: Normal and warm and dry    Hair Growth(lower extremity):  Right:normal; Left:  normal  NAILS: Toenails: normal  HEENT: Head: Normocephalic, atraumatic,  without obvious abnormality.  eye: normal external eye, no icterus  ears:normal external ears  PULM:  Repiratory effort normal  CV:  Dorsalis Pedis:  Right: 2+; Left:2+    Posterior Tibial: Right:2+; Left:2+    Capillary Refill:  Brisk  MSK:  Hand:right handed      Tibia:  Right:  non tender; Left:  non tender      Ankle:  Right: non tender, ROM  normal and symmetric and motor function  normal; Left:  non tender, ROM  normal and symmetric and motor function  normal      Foot:  Right:  non tender, ROM  normal and symmetric and motor function  normal; Left:  Mildly tender under the medial lateral sesamoid, no swelling, small dorsal osteophyte first MTPJ, she has about 30 degrees passive dorsiflexion, 20 degrees passive plantarflexion of the first MTPJ, which is less than the right.  She could not actively plantar flex as far as the right.  In testing the flexor pollicis longus I do feel that it flexes the DIP joint against resistance, EHL is intact, the FHP plantar flexes the first MTPJ, she does not have the same range of motion on the left compared with the right but it is the same both active and passive on the left      NEURO: Heel Walking:  Right:  normal; Left:  normal    Toe Walking:  Right:  normal; Left:  normal     Hortense-Alina 5.07 monofilament test: normal    Lower extremity sensation: intact     Reflexes:  Biceps:  Right:  not tested; Left:  not tested           Quads:  Right:  not tested; Left:  Not tested           Ankle:  Right:  not tested; Left:  not tested      Calf Atrophy:none    Motor  Function: all 5/5         Medical Decision Making    Data Review:   ordered and reviewed x-rays today, reviewed radiology images, reviewed radiology results and reviewed outside records    Assessment and Plan/ Diagnosis/Treatment options:   1. Left foot pain  The pain on the plantar surface is sesamoiditis, and I would recommend custom orthotics with a dancers pad and I gave her prescription  - XR Foot 2 View Left  - MRI Foot Left Without Contrast; Future    2. Joint stiffness of toe of left foot  The best way I can put this together is that it is actually stiffness in the joint that is causing the decreased ability to plantarflex.  To my evaluation the FHL and FHB both fire, and appeare to have normal strength.  I did not find any triggering, no pain with resisted testing of the FHL.  It is possible there is something that is blocking the tendon at the level of the joint, or its possible this is simply normal range of motion that she has secondary to the underlying first MTPJ arthritis.  She asked me if I had ever seen something like this and explained that yes arthritis I have seen limit joint excursion.  Also rupture of the FHL or triggering can cause it.  I would recommend we do an MRI for evaluation.  - MRI Foot Left Without Contrast; Future    3. Tendinitis of left flexor hallucis longus  As above I did not find any distinct triggering of the FHL, but it may be tendinitis at the level of the first MTPJ, an MRI will give us more information  - MRI Foot Left Without Contrast; Future            Radiology Ordered []  Radiology Reports Reviewed []      Radiology Images Reviewed []   Labs Reviewed []    Labs Ordered []   PCP Records Reviewed []    Provider Records Reviewed []    ER Records Reviewed []    Hospital Records Reviewed []    History Obtained From Family []    Phone conversation with Provider []    Records Requested []        Ashley Gomez MD

## 2020-12-29 ENCOUNTER — HOSPITAL ENCOUNTER (OUTPATIENT)
Dept: MRI IMAGING | Facility: HOSPITAL | Age: 60
Discharge: HOME | End: 2020-12-29
Admitting: ORTHOPAEDIC SURGERY

## 2020-12-29 DIAGNOSIS — M77.52 TENDINITIS OF LEFT FLEXOR HALLUCIS LONGUS: ICD-10-CM

## 2020-12-29 DIAGNOSIS — M25.675 JOINT STIFFNESS OF TOE OF LEFT FOOT: ICD-10-CM

## 2020-12-29 DIAGNOSIS — M79.672 LEFT FOOT PAIN: ICD-10-CM

## 2020-12-29 PROCEDURE — 73718 MRI LOWER EXTREMITY W/O DYE: CPT

## 2021-01-04 ENCOUNTER — OFFICE VISIT (OUTPATIENT)
Dept: ORTHOPEDIC SURGERY | Facility: CLINIC | Age: 61
End: 2021-01-04

## 2021-01-04 VITALS — BODY MASS INDEX: 30.36 KG/M2 | WEIGHT: 165 LBS | HEIGHT: 62 IN | OXYGEN SATURATION: 98 % | HEART RATE: 92 BPM

## 2021-01-04 DIAGNOSIS — M25.675 JOINT STIFFNESS OF TOE OF LEFT FOOT: Primary | ICD-10-CM

## 2021-01-04 PROCEDURE — 99213 OFFICE O/P EST LOW 20 MIN: CPT | Performed by: ORTHOPAEDIC SURGERY

## 2021-01-04 NOTE — PROGRESS NOTES
ESTABLISHED PATIENT    Patient: Varsha Lockhart  : 1960    Primary Care Provider: Mitul Robles MD    Requesting Provider: As above    Follow-up (Left foot MRI follow up. MRI done on 2020)      History    Chief Complaint: Left foot decreased range of motion    History of Present Illness: She returns for follow-up of the MRI of her left foot, done 2020.  I looked at the films and the report.  The great toe does have arthritis, there is also some lateral sesamoiditis.  There is also some longitudinal thickening with longitudinal split in the flexor hallucis longus in the midfoot out to the toe, but no transverse tear, no tendinitis around the ankle.  I think that all 3 things account for the mild decreased range of motion in the great toe.  I explained all 3 of them to her.  She is not at all tender over the flexor tendon therefore I think the tendinitis is true, but not causing pain.  Her tenderness is under the sesamoid and again I think a dancers pad will help this.  I think the biggest reason she has decreased range of motion in the great toe is some arthritis in that toe.    Current Outpatient Medications on File Prior to Visit   Medication Sig Dispense Refill   • amoxicillin-clavulanate (Augmentin) 875-125 MG per tablet Take 1 tablet by mouth 2 (Two) Times a Day. 14 tablet 0   • gabapentin (NEURONTIN) 100 MG capsule One tab in am and afternoon and 3 tabs in the evening for headache (Patient taking differently: 100 mg 3 (Three) Times a Day. One tab in am and afternoon and 3 tabs in the evening for headache) 180 capsule 1     No current facility-administered medications on file prior to visit.       No Known Allergies   Past Medical History:   Diagnosis Date   • Depression    • Headache     negative CT scan of head   • High cholesterol    •  labor     and pre-eclampsia with loss of  child   • Strain of right Achilles tendon 2016    massage therapy and rest   • Trigeminal  "neuralgia 2020 left side     Past Surgical History:   Procedure Laterality Date   •  SECTION       and    • THUMB ARTHROSCOPY Right     Chipped Bone   • TUBAL ABDOMINAL LIGATION       Family History   Problem Relation Age of Onset   • Coronary artery disease Maternal Grandmother    • Diabetes type II Maternal Grandmother    • Diabetes Maternal Grandmother    • Ulcerative colitis Other    • Diabetes Mother    • Hypothyroidism Mother    • Hyperlipidemia Father    • Breast cancer Sister    • Hypothyroidism Sister    • Diabetes Paternal Grandmother       Social History     Socioeconomic History   • Marital status:      Spouse name: Not on file   • Number of children: Not on file   • Years of education: Not on file   • Highest education level: Not on file   Tobacco Use   • Smoking status: Never Smoker   • Smokeless tobacco: Never Used   Substance and Sexual Activity   • Alcohol use: Never     Frequency: Never   • Drug use: Never   • Sexual activity: Defer        Review of Systems   Constitutional: Negative.    HENT: Negative.    Eyes: Negative.    Respiratory: Negative.    Cardiovascular: Negative.    Gastrointestinal: Negative.    Endocrine: Negative.    Genitourinary: Negative.    Musculoskeletal: Positive for arthralgias.   Skin: Negative.    Allergic/Immunologic: Negative.    Neurological: Negative.    Hematological: Negative.    Psychiatric/Behavioral: Negative.        The following portions of the patient's history were reviewed and updated as appropriate: allergies, current medications, past family history, past medical history, past social history, past surgical history and problem list.    Physical Exam:   Pulse 92   Ht 157.5 cm (62.01\")   Wt 74.8 kg (165 lb)   SpO2 98%   BMI 30.17 kg/m²   GENERAL: Gait: normal       MSK:  Ankle:  Right: non tender; Left:  non tender        Foot:  Right:  non tender; Left:  Slightly tender under the lateral " sesamoid    NEURO Sensation:  intact     Medical Decision Making    Data Review:   reviewed radiology images and reviewed radiology results    Assessment/Plan/Diagnosis/Treatment Options:   1. Joint stiffness of toe of left foot  As above I think the primary reason she has some decreased range of motion great toe is some arthritis.  I think the tendinitis on the MRI is a true finding, I explained that it is a longitudinal split in the tendon rather than tearing across the tendon.  I explained that if you look at the tendon in person generally you do not even see the longitudinal split you just see some enlargement of the tendon.  I think the mild pain that she has is due to sesamoiditis.  I explained that we cannot change the focal area of tendon abnormality nor the arthritis.  They are both aging changes.  If she were symptomatic in the tendon I would immobilize it for a while but she is not tender there.  She asked me if any of this would cause knee or other problems and I do not think so.  I do recommend a custom orthotics with a dancers pad for the sesamoiditis.  We discussed everything in detail.  She reports she has been very worried about this, and I tried to reassure her, this is unlikely to cause any other joint problems.  I will be happy to see her anytime.

## 2021-01-12 ENCOUNTER — OFFICE VISIT (OUTPATIENT)
Dept: INTERNAL MEDICINE | Facility: CLINIC | Age: 61
End: 2021-01-12

## 2021-01-12 VITALS
DIASTOLIC BLOOD PRESSURE: 78 MMHG | TEMPERATURE: 97.1 F | OXYGEN SATURATION: 95 % | HEART RATE: 71 BPM | SYSTOLIC BLOOD PRESSURE: 130 MMHG | HEIGHT: 62 IN | BODY MASS INDEX: 30.17 KG/M2

## 2021-01-12 DIAGNOSIS — Z78.0 POST-MENOPAUSAL: Primary | ICD-10-CM

## 2021-01-12 DIAGNOSIS — Z01.419 ROUTINE GYNECOLOGICAL EXAMINATION: ICD-10-CM

## 2021-01-12 PROCEDURE — 99396 PREV VISIT EST AGE 40-64: CPT | Performed by: PHYSICIAN ASSISTANT

## 2021-01-12 NOTE — PATIENT INSTRUCTIONS
"BMI for Adults  What is BMI?  Body mass index (BMI) is a number that is calculated from a person's weight and height. BMI can help estimate how much of a person's weight is composed of fat. BMI does not measure body fat directly. Rather, it is an alternative to procedures that directly measure body fat, which can be difficult and expensive.  BMI can help identify people who may be at higher risk for certain medical problems.  What are BMI measurements used for?  BMI is used as a screening tool to identify possible weight problems. It helps determine whether a person is obese, overweight, a healthy weight, or underweight.  BMI is useful for:  · Identifying a weight problem that may be related to a medical condition or may increase the risk for medical problems.  · Promoting changes, such as changes in diet and exercise, to help reach a healthy weight. BMI screening can be repeated to see if these changes are working.  How is BMI calculated?  BMI involves measuring your weight in relation to your height. Both height and weight are measured, and the BMI is calculated from those numbers. This can be done either in English (U[place] or metric measurements. Note that charts and online BMI calculators are available to help you find your BMI quickly and easily without having to do these calculations yourself.  To calculate your BMI in English ([place] measurements:    1. Measure your weight in pounds (lb).  2. Multiply the number of pounds by 703.  ? For example, for a person who weighs 180 lb, multiply that number by 703, which equals 126,540.  3. Measure your height in inches. Then multiply that number by itself to get a measurement called \"inches squared.\"  ? For example, for a person who is 70 inches tall, the \"inches squared\" measurement is 70 inches x 70 inches, which equals 4,900 inches squared.  4. Divide the total from step 2 (number of lb x 703) by the total from step 3 (inches squared): 126,540 ÷ 4,900 = 25.8. This is " "your BMI.  To calculate your BMI in metric measurements:  1. Measure your weight in kilograms (kg).  2. Measure your height in meters (m). Then multiply that number by itself to get a measurement called \"meters squared.\"  ? For example, for a person who is 1.75 m tall, the \"meters squared\" measurement is 1.75 m x 1.75 m, which is equal to 3.1 meters squared.  3. Divide the number of kilograms (your weight) by the meters squared number. In this example: 70 ÷ 3.1 = 22.6. This is your BMI.  What do the results mean?  BMI charts are used to identify whether you are underweight, normal weight, overweight, or obese. The following guidelines will be used:  · Underweight: BMI less than 18.5.  · Normal weight: BMI between 18.5 and 24.9.  · Overweight: BMI between 25 and 29.9.  · Obese: BMI of 30 or above.  Keep these notes in mind:  · Weight includes both fat and muscle, so someone with a muscular build, such as an athlete, may have a BMI that is higher than 24.9. In cases like these, BMI is not an accurate measure of body fat.  · To determine if excess body fat is the cause of a BMI of 25 or higher, further assessments may need to be done by a health care provider.  · BMI is usually interpreted in the same way for men and women.  Where to find more information  For more information about BMI, including tools to quickly calculate your BMI, go to these websites:  · Centers for Disease Control and Prevention: www.cdc.gov  · American Heart Association: www.heart.org  · National Heart, Lung, and Blood [place]: www.nhlbi.nih.gov  Summary  · Body mass index (BMI) is a number that is calculated from a person's weight and height.  · BMI may help estimate how much of a person's weight is composed of fat. BMI can help identify those who may be at higher risk for certain medical problems.  · BMI can be measured using English measurements or metric measurements.  · BMI charts are used to identify whether you are underweight, normal " weight, overweight, or obese.  This information is not intended to replace advice given to you by your health care provider. Make sure you discuss any questions you have with your health care provider.  Document Revised: 09/09/2020 Document Reviewed: 07/17/2020  Elsevier Patient Education © 2020 [employer]

## 2021-01-22 ENCOUNTER — APPOINTMENT (OUTPATIENT)
Dept: NEUROLOGY | Facility: HOSPITAL | Age: 61
End: 2021-01-22

## 2021-02-25 ENCOUNTER — IMMUNIZATION (OUTPATIENT)
Dept: VACCINE CLINIC | Facility: HOSPITAL | Age: 61
End: 2021-02-25

## 2021-02-25 PROCEDURE — 91301 HC SARSCO02 VAC 100MCG/0.5ML IM: CPT | Performed by: INTERNAL MEDICINE

## 2021-02-25 PROCEDURE — 0011A: CPT | Performed by: INTERNAL MEDICINE

## 2021-03-05 ENCOUNTER — OFFICE VISIT (OUTPATIENT)
Dept: INTERNAL MEDICINE | Facility: CLINIC | Age: 61
End: 2021-03-05

## 2021-03-05 VITALS
SYSTOLIC BLOOD PRESSURE: 110 MMHG | WEIGHT: 170 LBS | HEIGHT: 62 IN | BODY MASS INDEX: 31.28 KG/M2 | DIASTOLIC BLOOD PRESSURE: 72 MMHG | HEART RATE: 65 BPM | TEMPERATURE: 97.3 F

## 2021-03-05 DIAGNOSIS — G50.0 TRIGEMINAL NEURALGIA: Primary | ICD-10-CM

## 2021-03-05 DIAGNOSIS — R60.9 EDEMA, UNSPECIFIED TYPE: ICD-10-CM

## 2021-03-05 DIAGNOSIS — E66.09 CLASS 1 OBESITY DUE TO EXCESS CALORIES WITHOUT SERIOUS COMORBIDITY WITH BODY MASS INDEX (BMI) OF 31.0 TO 31.9 IN ADULT: ICD-10-CM

## 2021-03-05 PROCEDURE — 99214 OFFICE O/P EST MOD 30 MIN: CPT | Performed by: INTERNAL MEDICINE

## 2021-03-05 RX ORDER — GABAPENTIN 100 MG/1
CAPSULE ORAL
Qty: 30 CAPSULE | Refills: 1
Start: 2021-03-05 | End: 2021-10-06 | Stop reason: SDUPTHER

## 2021-03-05 NOTE — PROGRESS NOTES
"Chief Complaint   Patient presents with   • Headache   • Obesity       History of Present Illness  60 y.o. white female presents for follow up of trigemninal neuralgia. The pain is better.     Review of Systems   Constitutional: Negative for chills and fever.   HENT: Negative for sore throat.    Respiratory: Negative for cough and shortness of breath.    Cardiovascular: Negative for chest pain and palpitations.   Gastrointestinal: Negative for abdominal pain, nausea and vomiting.   Musculoskeletal: Negative for arthralgias (foot pain improved ).   Skin: Negative for rash.   Neurological: Negative for dizziness, light-headedness and headaches (her headache is better now ).   Psychiatric/Behavioral: Positive for sleep disturbance. Negative for dysphoric mood. The patient is not nervous/anxious.    All other systems reviewed and are negative.        PMS[facility]:  The following portions of the patient's history were reviewed and updated as appropriate: allergies, current medications, past family history, past medical history, past social history, past surgical history and problem list.      Current Outpatient Medications:   •  gabapentin (NEURONTIN) 100 MG capsule, .1 tab in the evening. Prn headache., Disp: 30 capsule, Rfl: 1    VITALS:  /72   Pulse 65   Temp 97.3 °F (36.3 °C)   Ht 157.5 cm (62.01\")   Wt 77.1 kg (170 lb)   BMI 31.09 kg/m²     Physical Exam  Vitals signs and nursing note reviewed.   Constitutional:       Appearance: Normal appearance. She is well-developed.   HENT:      Head: Normocephalic.      Right Ear: Tympanic membrane and ear canal normal.      Left Ear: Tympanic membrane and ear canal normal.   Eyes:      Extraocular Movements: Extraocular movements intact.      Conjunctiva/sclera: Conjunctivae normal.   Cardiovascular:      Rate and Rhythm: Normal rate and regular rhythm.      Heart sounds: Normal heart sounds.   Pulmonary:      Effort: Pulmonary effort is normal. No respiratory distress. "      Breath sounds: Normal breath sounds.   Abdominal:      General: Bowel sounds are normal.      Palpations: Abdomen is soft.      Tenderness: There is no abdominal tenderness.      Comments: obese   Skin:     General: Skin is warm and dry.   Neurological:      General: No focal deficit present.      Mental Status: She is alert and oriented to person, place, and time.   Psychiatric:         Mood and Affect: Mood normal.         Behavior: Behavior normal.         LABS:  Results for orders placed or performed in visit on 11/16/20   Basic Metabolic Panel    Specimen: Blood   Result Value Ref Range    Glucose 105 (H) 65 - 99 mg/dL    BUN 14 8 - 23 mg/dL    Creatinine 0.75 0.57 - 1.00 mg/dL    Sodium 141 136 - 145 mmol/L    Potassium 4.4 3.5 - 5.2 mmol/L    Chloride 103 98 - 107 mmol/L    CO2 30.6 (H) 22.0 - 29.0 mmol/L    Calcium 9.5 8.6 - 10.5 mg/dL    eGFR Non African Amer 78 >60 mL/min/1.73    BUN/Creatinine Ratio 18.7 7.0 - 25.0    Anion Gap 7.4 5.0 - 15.0 mmol/L       Procedures         ASSESSMENT/PLAN:  Problems Addressed this Visit        Endocrine and Metabolic    Class 1 obesity due to excess calories without serious comorbidity with body mass index (BMI) of 31.0 to 31.9 in adult     Patient's (Body mass index is 31.09 kg/m².) indicates that they are obese (BMI >30) with obesity-related health conditions that include dyslipidemias . Obesity is improving with lifestyle modifications. BMI is is above average; BMI management plan is completed. We discussed portion control and increasing exercise. She will stop calories at 7 pm and do interval training             Neuro    Trigeminal neuralgia - Primary     She has tapered down to one gabapentin a day and will stop that and use as needed for left sided trigeminal neuralgia.          Relevant Medications    gabapentin (NEURONTIN) 100 MG capsule      Other Visit Diagnoses     Edema, unspecified type        She has episodic edema that should improve some off  gabapentin and her creatine was good at 0.75 and GFR of 78 and normal albumin 4.2      Diagnoses       Codes Comments    Trigeminal neuralgia    -  Primary ICD-10-CM: G50.0  ICD-9-CM: 350.1     Class 1 obesity due to excess calories without serious comorbidity with body mass index (BMI) of 31.0 to 31.9 in adult     ICD-10-CM: E66.09, Z68.31  ICD-9-CM: 278.00, V85.31     Edema, unspecified type     ICD-10-CM: R60.9  ICD-9-CM: 782.3 She has episodic edema that should improve some off gabapentin and her creatine was good at 0.75 and GFR of 78 and normal albumin 4.2          FOLLOW-UP:  Return in about 7 months (around 10/6/2021) for Annual physical.      Electronically signed by:    Mitul Robles MD

## 2021-03-05 NOTE — ASSESSMENT & PLAN NOTE
Patient's (Body mass index is 31.09 kg/m².) indicates that they are obese (BMI >30) with obesity-related health conditions that include dyslipidemias . Obesity is improving with lifestyle modifications. BMI is is above average; BMI management plan is completed. We discussed portion control and increasing exercise. She will stop calories at 7 pm and do interval training

## 2021-03-06 NOTE — ASSESSMENT & PLAN NOTE
She has tapered down to one gabapentin a day and will stop that and use as needed for left sided trigeminal neuralgia.

## 2021-03-18 ENCOUNTER — TRANSCRIBE ORDERS (OUTPATIENT)
Dept: ADMINISTRATIVE | Facility: HOSPITAL | Age: 61
End: 2021-03-18

## 2021-03-18 DIAGNOSIS — K11.20 SIALOADENITIS: Primary | ICD-10-CM

## 2021-03-24 ENCOUNTER — HOSPITAL ENCOUNTER (OUTPATIENT)
Dept: ULTRASOUND IMAGING | Facility: HOSPITAL | Age: 61
Discharge: HOME | End: 2021-03-24
Admitting: OTOLARYNGOLOGY

## 2021-03-24 DIAGNOSIS — K11.20 SIALOADENITIS: ICD-10-CM

## 2021-03-24 PROCEDURE — 76536 US EXAM OF HEAD AND NECK: CPT

## 2021-03-25 ENCOUNTER — IMMUNIZATION (OUTPATIENT)
Dept: VACCINE CLINIC | Facility: HOSPITAL | Age: 61
End: 2021-03-25

## 2021-03-25 PROCEDURE — 0012A: CPT | Performed by: INTERNAL MEDICINE

## 2021-03-25 PROCEDURE — 91301 HC SARSCO02 VAC 100MCG/0.5ML IM: CPT | Performed by: INTERNAL MEDICINE

## 2021-06-15 ENCOUNTER — HOSPITAL ENCOUNTER (OUTPATIENT)
Dept: BONE DENSITY | Facility: HOSPITAL | Age: 61
Discharge: HOME | End: 2021-06-15
Admitting: PHYSICIAN ASSISTANT

## 2021-06-15 PROCEDURE — 77080 DXA BONE DENSITY AXIAL: CPT

## 2021-10-06 ENCOUNTER — OFFICE VISIT (OUTPATIENT)
Dept: INTERNAL MEDICINE | Facility: CLINIC | Age: 61
End: 2021-10-06

## 2021-10-06 ENCOUNTER — LAB (OUTPATIENT)
Dept: LAB | Facility: HOSPITAL | Age: 61
End: 2021-10-06

## 2021-10-06 VITALS
TEMPERATURE: 98 F | BODY MASS INDEX: 30.69 KG/M2 | WEIGHT: 166.8 LBS | HEIGHT: 62 IN | HEART RATE: 64 BPM | DIASTOLIC BLOOD PRESSURE: 80 MMHG | SYSTOLIC BLOOD PRESSURE: 118 MMHG

## 2021-10-06 DIAGNOSIS — E66.09 CLASS 1 OBESITY DUE TO EXCESS CALORIES WITHOUT SERIOUS COMORBIDITY WITH BODY MASS INDEX (BMI) OF 31.0 TO 31.9 IN ADULT: ICD-10-CM

## 2021-10-06 DIAGNOSIS — Z00.00 PREVENTATIVE HEALTH CARE: Primary | ICD-10-CM

## 2021-10-06 DIAGNOSIS — G50.0 TRIGEMINAL NEURALGIA: ICD-10-CM

## 2021-10-06 DIAGNOSIS — E78.00 ELEVATED CHOLESTEROL: ICD-10-CM

## 2021-10-06 DIAGNOSIS — Z23 NEED FOR VACCINATION: ICD-10-CM

## 2021-10-06 DIAGNOSIS — M85.859 OSTEOPENIA OF NECK OF FEMUR, UNSPECIFIED LATERALITY: ICD-10-CM

## 2021-10-06 LAB
ALBUMIN SERPL-MCNC: 4.5 G/DL (ref 3.5–5.2)
ALBUMIN/GLOB SERPL: 2 G/DL
ALP SERPL-CCNC: 57 U/L (ref 39–117)
ALT SERPL W P-5'-P-CCNC: 29 U/L (ref 1–33)
ANION GAP SERPL CALCULATED.3IONS-SCNC: 8.2 MMOL/L (ref 5–15)
AST SERPL-CCNC: 26 U/L (ref 1–32)
BILIRUB SERPL-MCNC: 0.2 MG/DL (ref 0–1.2)
BUN SERPL-MCNC: 16 MG/DL (ref 8–23)
BUN/CREAT SERPL: 19.3 (ref 7–25)
CALCIUM SPEC-SCNC: 9.5 MG/DL (ref 8.6–10.5)
CHLORIDE SERPL-SCNC: 104 MMOL/L (ref 98–107)
CHOLEST SERPL-MCNC: 253 MG/DL (ref 0–200)
CO2 SERPL-SCNC: 27.8 MMOL/L (ref 22–29)
CREAT SERPL-MCNC: 0.83 MG/DL (ref 0.57–1)
GFR SERPL CREATININE-BSD FRML MDRD: 69 ML/MIN/1.73
GLOBULIN UR ELPH-MCNC: 2.3 GM/DL
GLUCOSE SERPL-MCNC: 102 MG/DL (ref 65–99)
HDLC SERPL-MCNC: 77 MG/DL (ref 40–60)
LDLC SERPL CALC-MCNC: 161 MG/DL (ref 0–100)
LDLC/HDLC SERPL: 2.05 {RATIO}
POTASSIUM SERPL-SCNC: 4.4 MMOL/L (ref 3.5–5.2)
PROT SERPL-MCNC: 6.8 G/DL (ref 6–8.5)
SODIUM SERPL-SCNC: 140 MMOL/L (ref 136–145)
TRIGL SERPL-MCNC: 91 MG/DL (ref 0–150)
TSH SERPL DL<=0.05 MIU/L-ACNC: 2.22 UIU/ML (ref 0.27–4.2)
VLDLC SERPL-MCNC: 15 MG/DL (ref 5–40)

## 2021-10-06 PROCEDURE — 99396 PREV VISIT EST AGE 40-64: CPT | Performed by: INTERNAL MEDICINE

## 2021-10-06 PROCEDURE — 90471 IMMUNIZATION ADMIN: CPT | Performed by: INTERNAL MEDICINE

## 2021-10-06 PROCEDURE — 90472 IMMUNIZATION ADMIN EACH ADD: CPT | Performed by: INTERNAL MEDICINE

## 2021-10-06 PROCEDURE — 80053 COMPREHEN METABOLIC PANEL: CPT

## 2021-10-06 PROCEDURE — 90750 HZV VACC RECOMBINANT IM: CPT | Performed by: INTERNAL MEDICINE

## 2021-10-06 PROCEDURE — 80061 LIPID PANEL: CPT

## 2021-10-06 PROCEDURE — 84443 ASSAY THYROID STIM HORMONE: CPT

## 2021-10-06 PROCEDURE — 90686 IIV4 VACC NO PRSV 0.5 ML IM: CPT | Performed by: INTERNAL MEDICINE

## 2021-10-06 RX ORDER — GABAPENTIN 100 MG/1
CAPSULE ORAL
Qty: 30 CAPSULE | Refills: 1
Start: 2021-10-06

## 2021-10-06 NOTE — ASSESSMENT & PLAN NOTE
Plan vitamin D 1000 IU a day and regular walking and calcium 500 mg per day. Repeat in 3 years at welcome to medicare

## 2021-10-06 NOTE — ASSESSMENT & PLAN NOTE
Patient's (Body mass index is 30.51 kg/m².) indicates that they are obese (BMI >30) with health conditions that include dyslipidemias . Weight is improving with lifestyle modifications. BMI is is above average; BMI management plan is completed. We discussed portion control and increasing exercise.

## 2021-10-06 NOTE — ASSESSMENT & PLAN NOTE
She gets yearly eye exams with Jose Marie. Her mood is good overall.She has had good pap with Pamela and her bone density was ok overall.  Age-appropriate Counseling:  Discussed preventative medicine issues with patient including regular exercise, healthy diet, stress reduction, adequate sleep and recommended age-appropriate screening studies.  Immunizations reviewed.

## 2021-10-06 NOTE — PROGRESS NOTES
"Chief Complaint   Patient presents with   • Annual Exam     patient is fasting       History of Present Illness  61 y.o. white female presents for wellness exam.     Review of Systems   Constitutional: Negative for chills and fever.   Respiratory: Negative for cough and shortness of breath.    Cardiovascular: Negative for chest pain and palpitations.   Gastrointestinal: Negative for abdominal pain, nausea and vomiting.   Skin: Negative for rash.   Neurological: Negative for dizziness, light-headedness and headaches.   Psychiatric/Behavioral: Negative for dysphoric mood and sleep disturbance.   All other systems reviewed and are negative.    .    PMSFH:  The following portions of the patient's history were reviewed and updated as appropriate: allergies, current medications, past family history, past medical history, past social history, past surgical history and problem list.      Current Outpatient Medications:   •  gabapentin (NEURONTIN) 100 MG capsule, .1 tab in the evening. Prn headache., Disp: 30 capsule, Rfl: 1    VITALS:  /80 (BP Location: Left arm, Patient Position: Sitting, Cuff Size: Adult)   Pulse 64   Temp 98 °F (36.7 °C)   Ht 157.5 cm (62\")   Wt 75.7 kg (166 lb 12.8 oz)   BMI 30.51 kg/m²     Physical Exam  Vitals and nursing note reviewed.   Constitutional:       Appearance: Normal appearance. She is well-developed.   HENT:      Head: Normocephalic.      Right Ear: Tympanic membrane and ear canal normal.      Left Ear: Tympanic membrane and ear canal normal.   Eyes:      Extraocular Movements: Extraocular movements intact.      Conjunctiva/sclera: Conjunctivae normal.   Cardiovascular:      Rate and Rhythm: Normal rate and regular rhythm.      Heart sounds: Normal heart sounds.   Pulmonary:      Effort: Pulmonary effort is normal. No respiratory distress.      Breath sounds: Normal breath sounds.   Abdominal:      General: Bowel sounds are normal.      Palpations: Abdomen is soft.      " Tenderness: There is no abdominal tenderness. There is no guarding.   Musculoskeletal:         General: No swelling.   Skin:     General: Skin is warm and dry.   Neurological:      General: No focal deficit present.      Mental Status: She is alert and oriented to person, place, and time.   Psychiatric:         Mood and Affect: Mood normal.         Behavior: Behavior normal.         LABS:      Procedures         ASSESSMENT/PLAN:  Problems Addressed this Visit        Cardiac and Vasculature    Elevated cholesterol     Lipid abnormalities are improving with lifestyle modifications.  Nutritional counseling was provided.  Lipids will be reassessed in 1 year.         Relevant Orders    Comprehensive Metabolic Panel    Lipid Panel    TSH Rfx On Abnormal To Free T4       Endocrine and Metabolic    Class 1 obesity due to excess calories without serious comorbidity with body mass index (BMI) of 31.0 to 31.9 in adult     Patient's (Body mass index is 30.51 kg/m².) indicates that they are obese (BMI >30) with health conditions that include dyslipidemias . Weight is improving with lifestyle modifications. BMI is is above average; BMI management plan is completed. We discussed portion control and increasing exercise.             Health Encounters    Preventative health care - Primary     She gets yearly eye exams with Jose Marie. Her mood is good overall.She has had good pap with Pamela and her bone density was ok overall.  Age-appropriate Counseling:  Discussed preventative medicine issues with patient including regular exercise, healthy diet, stress reduction, adequate sleep and recommended age-appropriate screening studies.  Immunizations reviewed.                 Musculoskeletal and Injuries    Osteopenia of neck of femur     Plan vitamin D 1000 IU a day and regular walking and calcium 500 mg per day. Repeat in 3 years at welcome to medicare             Neuro    Trigeminal neuralgia     Doing well and use gabapentin only  as needed and not needing it.          Relevant Medications    gabapentin (NEURONTIN) 100 MG capsule      Other Visit Diagnoses     Need for vaccination        Relevant Orders    Shingrix Vaccine (Completed)      Diagnoses       Codes Comments    Preventative health care    -  Primary ICD-10-CM: Z00.00  ICD-9-CM: V70.0     Class 1 obesity due to excess calories without serious comorbidity with body mass index (BMI) of 31.0 to 31.9 in adult     ICD-10-CM: E66.09, Z68.31  ICD-9-CM: 278.00, V85.31     Elevated cholesterol     ICD-10-CM: E78.00  ICD-9-CM: 272.0     Need for vaccination     ICD-10-CM: Z23  ICD-9-CM: V05.9     Trigeminal neuralgia     ICD-10-CM: G50.0  ICD-9-CM: 350.1     Osteopenia of neck of femur, unspecified laterality     ICD-10-CM: M85.859  ICD-9-CM: 733.90           FOLLOW-UP:  Return in about 1 year (around 10/6/2022) for Annual physical, Labs this visit.      Electronically signed by:    Mitul Robles MD

## 2022-01-21 DIAGNOSIS — Z23 VACCINE FOR DIPHTHERIA-TETANUS: Primary | ICD-10-CM

## 2022-02-17 ENCOUNTER — CLINICAL SUPPORT (OUTPATIENT)
Dept: INTERNAL MEDICINE | Facility: CLINIC | Age: 62
End: 2022-02-17

## 2022-02-17 PROCEDURE — 90715 TDAP VACCINE 7 YRS/> IM: CPT | Performed by: INTERNAL MEDICINE

## 2022-02-17 PROCEDURE — 90471 IMMUNIZATION ADMIN: CPT | Performed by: INTERNAL MEDICINE

## 2022-02-24 ENCOUNTER — PATIENT MESSAGE (OUTPATIENT)
Dept: INTERNAL MEDICINE | Facility: CLINIC | Age: 62
End: 2022-02-24

## 2022-04-04 RX ORDER — AZITHROMYCIN 250 MG/1
250 TABLET, FILM COATED ORAL TAKE AS DIRECTED
Qty: 6 TABLET | Refills: 0 | Status: SHIPPED | OUTPATIENT
Start: 2022-04-04 | End: 2022-04-09

## 2022-05-03 ENCOUNTER — OFFICE VISIT (OUTPATIENT)
Dept: INTERNAL MEDICINE | Facility: CLINIC | Age: 62
End: 2022-05-03

## 2022-05-03 VITALS
TEMPERATURE: 99.1 F | BODY MASS INDEX: 30.5 KG/M2 | SYSTOLIC BLOOD PRESSURE: 111 MMHG | HEIGHT: 62 IN | HEART RATE: 76 BPM | DIASTOLIC BLOOD PRESSURE: 74 MMHG

## 2022-05-03 DIAGNOSIS — R03.0 ELEVATED BLOOD PRESSURE READING: Primary | ICD-10-CM

## 2022-05-03 DIAGNOSIS — K11.20 SALIVARY GLAND ADENITIS: ICD-10-CM

## 2022-05-03 DIAGNOSIS — M25.562 ACUTE PAIN OF LEFT KNEE: ICD-10-CM

## 2022-05-03 DIAGNOSIS — M79.641 PAIN OF RIGHT HAND: ICD-10-CM

## 2022-05-03 PROCEDURE — 99214 OFFICE O/P EST MOD 30 MIN: CPT | Performed by: INTERNAL MEDICINE

## 2022-05-03 NOTE — PROGRESS NOTES
"Chief Complaint   Patient presents with   • Hand Pain     Right    • blocked glands       History of Present Illness  61 y.o. female  presents for evaluation of right hand cyst and and gland under jaw tender and some knee pain.     Review of Systems  .    [facility]:  The following portions of the patient's history were reviewed and updated as appropriate: allergies, current medications, past family history, past medical history, past social history, past surgical history and problem list.      Current Outpatient Medications:   •  gabapentin (NEURONTIN) 100 MG capsule, .1 tab in the evening. Prn headache., Disp: 30 capsule, Rfl: 1    VITALS:  /74   Pulse 76   Temp 99.1 °F (37.3 °C)   Ht 157.5 cm (62.01\")   BMI 30.50 kg/m²     Physical Exam  Constitutional:       Appearance: Normal appearance.   Neck:      Comments: Mild glands right > left enlargement submandibular mildly tender    Cardiovascular:      Rate and Rhythm: Normal rate and regular rhythm.   Pulmonary:      Effort: Pulmonary effort is normal.      Breath sounds: Normal breath sounds.   Musculoskeletal:         General: Tenderness (hard non tender nodule dorsum right hand ) present.   Neurological:      General: No focal deficit present.      Mental Status: She is alert and oriented to person, place, and time.         LABS:      Procedures         ASSESSMENT/PLAN:  Problems Addressed this Visit        Cardiac and Vasculature    Elevated blood pressure reading - Primary     Initially up and but improved.                ENT    Salivary gland adenitis     She acute issue and will suck on lozenges and follow with Dr Narayan or Dr Agudelo              Musculoskeletal and Injuries    Acute pain of left knee     Acute issue and use tumeric 1000 with twice a day and use sleeve           Pain of right hand     She has what appears to be a mild dorsal arthritic deposits and will see Dr Dasilva.            Relevant Orders    Ambulatory Referral to Orthopedic " Surgery (Completed)      Diagnoses       Codes Comments    Elevated blood pressure reading    -  Primary ICD-10-CM: R03.0  ICD-9-CM: 796.2     Pain of right hand     ICD-10-CM: M79.641  ICD-9-CM: 729.5     Salivary gland adenitis     ICD-10-CM: K11.20  ICD-9-CM: 527.2     Acute pain of left knee     ICD-10-CM: M25.562  ICD-9-CM: 719.46           FOLLOW-UP:  No follow-ups on file.      Electronically signed by:    Mitul Robles MD

## 2024-12-11 DIAGNOSIS — R74.8 ELEVATED LIVER ENZYMES: Chronic | ICD-10-CM

## 2024-12-11 DIAGNOSIS — E55.9 VITAMIN D DEFICIENCY: ICD-10-CM

## 2024-12-11 DIAGNOSIS — R73.01 IMPAIRED FASTING GLUCOSE: Primary | Chronic | ICD-10-CM

## 2024-12-11 DIAGNOSIS — Z00.00 ROUTINE PHYSICAL EXAMINATION: ICD-10-CM

## 2024-12-11 DIAGNOSIS — R94.6 ABNORMAL THYROID FUNCTION TEST: ICD-10-CM

## 2024-12-11 DIAGNOSIS — E78.2 MIXED HYPERLIPIDEMIA: Chronic | ICD-10-CM

## 2024-12-11 DIAGNOSIS — G50.0 TRIGEMINAL NEURALGIA: ICD-10-CM

## 2024-12-11 PROBLEM — E66.811 CLASS 1 OBESITY DUE TO EXCESS CALORIES WITHOUT SERIOUS COMORBIDITY WITH BODY MASS INDEX (BMI) OF 31.0 TO 31.9 IN ADULT: Status: ACTIVE | Noted: 2019-04-15

## 2024-12-11 PROBLEM — L57.0 ACTINIC KERATOSES: Status: ACTIVE | Noted: 2024-12-11

## 2024-12-11 PROBLEM — M25.872 SESAMOIDITIS OF LEFT FOOT: Status: ACTIVE | Noted: 2024-12-11

## 2024-12-11 PROBLEM — R51.9: Status: RESOLVED | Noted: 2020-11-03 | Resolved: 2024-12-11

## 2024-12-11 PROBLEM — R03.0 ELEVATED BLOOD PRESSURE READING WITHOUT DIAGNOSIS OF HYPERTENSION: Chronic | Status: ACTIVE | Noted: 2020-11-03

## 2024-12-11 PROBLEM — Z01.419 ROUTINE GYNECOLOGICAL EXAMINATION: Status: RESOLVED | Noted: 2021-01-12 | Resolved: 2024-12-11

## 2024-12-11 PROBLEM — L71.9 ROSACEA: Status: ACTIVE | Noted: 2024-12-11

## 2024-12-11 PROBLEM — M25.562 ACUTE PAIN OF LEFT KNEE: Status: RESOLVED | Noted: 2022-05-03 | Resolved: 2024-12-11

## 2024-12-11 PROBLEM — M85.89 OSTEOPENIA OF MULTIPLE SITES: Status: ACTIVE | Noted: 2021-10-06

## 2024-12-11 PROBLEM — K11.20 PAROTITIS: Chronic | Status: ACTIVE | Noted: 2022-05-03

## 2024-12-11 PROBLEM — K57.30 DIVERTICULOSIS OF COLON: Status: ACTIVE | Noted: 2024-12-11

## 2024-12-11 PROBLEM — Z78.0 MENOPAUSE: Chronic | Status: ACTIVE | Noted: 2021-01-12

## 2024-12-11 PROBLEM — M25.331: Status: ACTIVE | Noted: 2022-05-03

## 2024-12-11 PROBLEM — E66.09 CLASS 1 OBESITY DUE TO EXCESS CALORIES WITHOUT SERIOUS COMORBIDITY WITH BODY MASS INDEX (BMI) OF 31.0 TO 31.9 IN ADULT: Status: ACTIVE | Noted: 2019-04-15

## 2024-12-11 PROBLEM — M85.89 OSTEOPENIA OF MULTIPLE SITES: Chronic | Status: ACTIVE | Noted: 2021-10-06

## 2024-12-11 PROBLEM — K64.8 INTERNAL HEMORRHOIDS: Status: ACTIVE | Noted: 2024-12-11

## 2025-01-12 PROBLEM — M50.30 DDD (DEGENERATIVE DISC DISEASE), CERVICAL: Status: ACTIVE | Noted: 2025-01-12

## 2025-01-12 PROBLEM — L71.9 ROSACEA: Chronic | Status: ACTIVE | Noted: 2024-12-11

## 2025-01-12 PROBLEM — K57.30 DIVERTICULOSIS OF COLON: Chronic | Status: ACTIVE | Noted: 2024-12-11

## 2025-01-12 PROBLEM — K64.8 INTERNAL HEMORRHOIDS: Chronic | Status: ACTIVE | Noted: 2024-12-11

## 2025-01-12 PROBLEM — L57.0 ACTINIC KERATOSES: Chronic | Status: ACTIVE | Noted: 2024-12-11

## 2025-01-12 PROBLEM — E66.09 CLASS 1 OBESITY DUE TO EXCESS CALORIES WITHOUT SERIOUS COMORBIDITY WITH BODY MASS INDEX (BMI) OF 31.0 TO 31.9 IN ADULT: Chronic | Status: ACTIVE | Noted: 2019-04-15

## 2025-01-12 PROBLEM — E66.811 CLASS 1 OBESITY DUE TO EXCESS CALORIES WITHOUT SERIOUS COMORBIDITY WITH BODY MASS INDEX (BMI) OF 31.0 TO 31.9 IN ADULT: Chronic | Status: ACTIVE | Noted: 2019-04-15

## 2025-01-12 PROBLEM — G50.0 TRIGEMINAL NEURALGIA: Chronic | Status: ACTIVE | Noted: 2020-11-04

## 2025-01-12 NOTE — ASSESSMENT & PLAN NOTE
Reviewed CT neck findings and recs per Dr. Rafi Narayan; patient wants to defer surgical intervention (bilat sialendoscopy) due to potential risk to exacerbate/trigger trigeminal neuralgia

## 2025-01-12 NOTE — ASSESSMENT & PLAN NOTE
Rec updated lipids with goal LDL < 130, ideally < 100; currently no meds; ADDENDUM: very elevated lipids with  - needs OV to discuss tx

## 2025-01-12 NOTE — PROGRESS NOTES
Chief Complaint   Patient presents with    Re-establish Care     Hyperlipidemia    Impaired fasting glucose       History of Present Illness  64 y.o.  female presents as a NEW PATIENT to establish care as well as for further evaluation of persistent right hand pain.     Notes stressors from 2024 with 's liver disease scare and getting let go from work at the [employer]. It was purchased by [employer], they kept her for 7 months, and then let her go. She is depressed from weight gain due to increased sedentary behavior since then. Had to stop playing tennis due to her R hand pain. Tennis was also her social [place]. Exercises with regular walking with her .     Reports R hand pain at the palm side from the index finger to the thumb that started about a year ago and progressively worsened thereafter. Did not have specific injury. Stopped playing tennis in 8/24 due to the pain. The pain only occurs when she is playing tennis, when the ball hits the racket. Does not hurt with holding the racket nor with any other household activities or gardening. Denies assoc'd weakness, numbness/tingling.    Reports Xrays showing arthritis. Reports brother-in-law put a steroid shot in the wrist (points to the radial area), and she developed subsequent tingling down the R thumb. The tingling is finally just recently starting to decrease. Patient is R-handed.    Patient reports remote h/o hand ortho consultation with Dr. Dasilva, dx'd with chipped bone in thumb. Also has a bony growth on the back side of the hand (points to the dorsal base of 2nd metacarpal area with bony protuberance just distal to the wrist), and this was dx'd as carpal boss. Does not cause her any pain. She does not think this is causing her R hand pain.    Reports ongoing recurrent parotid gland infections at least once per year, occurring on both sides. Last episode was 10/24. Has seen Dr. Agudelo and Dr. Narayan for treatment; most  recently referred to Dr. Rafi Narayan, whom she saw on 24, and he recommended her to proceed with a surgical procedure. Patient is not sure she wants to proceed; is currently asx.     Reports there has been discussion re: autoimmune disease but has not had lab workup. Also complains of dry mouth.    Was dx'd with trigeminal neuralgia . She is worried that the surgical procedure could exacerbate trigeminal neuralgia. Currently asx.    Reports weight gain, which is causing her depression. Admits to eating later at nighttime. Walks for exercise but not playing tennis.    Reports vaginal dryness. Saw Dr. Mo in the past and was RX'd bi-est E3:E2 compounded vaginal cream, which she states helped. Is sexually active. Last Pap  per pt.    PMH:  Hyperlipidemia  Elevated BP without dx of HTN  Impaired fasting glucose  Obesity  Rosacea  Diverticulosis  Internal hemorrhoids  Menopause  Atrophic vaginitis  Trigeminal neuralgia  Actinic keratoses  Right carpal boss  Osteopenia  Vitamin D deficiency  Hx of recurrent bilateral parotitis  Hx of skin CA  Hx of sesamoiditis L foot    PSH:  S/p   S/p R thumb arthroscopy ()  S/p TL ()  S/p lap wilda ()    SH:    Retired - membership mgt at [employer]  1 daughter  No tob use  Rare alcohol use  3 sisters and 2 brothers (patient is #3, 2nd daughter)    FH:  Mother (age 89) - DM, hypothyroidism  Father (age 90) - hyperlipidemia, mini-strokes, macular degeneration  Sister - breast CA (in her 40s), hypothyroidism  Sister - hypothyroidism  Sister - ulcerative colitis  MGM - CAD/MI (cause of death), DM  MGGM - DM, had amputations  PGM - DM    Review of Systems  ROS (+) for weight gain. ROS (+) for depression. ROS (+) for persistent R hand palm pain with paying tennis. ROS (+) for tingling thumb, slowly improving. ROS (+) for dry mouth. All other ROS reviewed and negative.    Current Outpatient Medications:     bi-est E3:E2 vaginal  "cream    ALLERGIES:  No Known Allergies      VITALS:  /84   Pulse 72   Ht 158.8 cm (62.5\")   Wt 83.6 kg (184 lb 3.2 oz)   SpO2 96%   BMI 33.15 kg/m²     12/2024 BMI 32.6    5/2022 BMI 31.1    Physical Exam  Vitals and nursing note reviewed.   Constitutional:       General: She is not in acute distress.     Appearance: Normal appearance. She is not ill-appearing.   Eyes:      Extraocular Movements: Extraocular movements intact.      Conjunctiva/sclera: Conjunctivae normal.   Pulmonary:      Effort: Pulmonary effort is normal. No respiratory distress.   Musculoskeletal:         General: Deformity (nontender bony prominence based of dorsal R metacarpal area just distal to the wrist, no erythema, crepitus, or swelling) present. No tenderness (tenderness distal to R thenar eminence, not reproducible with palpation or hand/finger mov't).   Neurological:      Mental Status: She is alert and oriented to person, place, and time. Mental status is at baseline.      Gait: Gait normal.   Psychiatric:         Mood and Affect: Mood normal.         Behavior: Behavior normal.         LABS  Results for orders placed or performed in visit on 01/14/25   POC Glycosylated Hemoglobin (Hb A1C)    Collection Time: 01/14/25 11:29 AM    Specimen: Blood   Result Value Ref Range    Hemoglobin A1C 5.6 4.5 - 5.7 %    Lot Number [id number]     Expiration Date 08/08/2026      10/6/21 , abn lipids , TG 91, HDL 77,     ASSESSMENT/PLAN    Diagnoses and all orders for this visit:    1. Impaired fasting glucose (Primary)  Assessment & Plan:  NEW dx; BG control stable with A1C 5.6; f/u again in 3 mos with next wellness    Orders:  -     POC Glycosylated Hemoglobin (Hb A1C)    2. Elevated blood pressure reading without diagnosis of hypertension  Assessment & Plan:  BP stable 112/84      3. Hyperlipidemia  Assessment & Plan:  Rec updated lipids with goal LDL < 130, ideally < 100; currently no meds; ADDENDUM: very elevated " lipids with  - needs OV to discuss tx      4. Osteopenia  Assessment & Plan:  Rec updated DEXA      5. Parotitis  Assessment & Plan:  Reviewed CT neck findings and recs per Dr. Rafi Narayan; patient wants to defer surgical intervention (bilat sialendoscopy) due to potential risk to exacerbate/trigger trigeminal neuralgia      6. Vaccine counseling  Comments:  rec flu vacc and COVID19 vacc; rec RSV vacc and Shingrix - get both of these at pharmacy    7. Need for COVID-19 vaccine  -     COVID-19 (Pfizer) 12yrs+ (COMIRNATY)    8. Dry mouth  Assessment & Plan:  Check a few autoimmune labs, to incl SSA/SSB, ESR, CRP, RANDY    Orders:  -     Sedimentation Rate; Future  -     C-reactive Protein; Future  -     RANDY by IFA, Reflex to Titer and Pattern; Future  -     Sjogren's Antibody, Anti-SS-A / -SS-B; Future    9. Postmenopausal atrophic vaginitis  Assessment & Plan:  Rec trying prescription estradiol vaginal cream 2x/week #42,5gm, 2RF; rec also using lubrication on both parties; advised would need to get compounded vaginal cream from GYN    Orders:  -     estradiol (ESTRACE VAGINAL) 0.1 MG/GM vaginal cream; Insert 2 g into the vagina Daily.  Dispense: 42.5 g; Refill: 2    10. Menopause  -     DEXA Bone Density Axial; Future    11. Carpal boss of right wrist  Assessment & Plan:  Asx; do not think this is related to her R palmar 2nd metacarpal pain      12. Right hand pain  Assessment & Plan:  R palmar 2nd metacarpal pain sugg of tendinitis, only occurring with playing tennis (when ball hits the racket); less likely d/t OA and carpal boss; has not played tennis for almost 6 mos so she wants to retry playing tennis to see if pain recurs; if it does, strongly recommend course of hand PT: if no better with hand PT, then rec MRI R hand for further evaluation      13. Tingling of right thumb  Comments:  likely nerve injury after steroid injection in wrist; is slowly improving; follow clinically    14. Class 1 obesity due  to excess calories with serious comorbidity and body mass index (BMI) of 33.0 to 33.9 in adult  Assessment & Plan:  Patient's (Body mass index is 33.15 kg/m².) with stress and/or boredom eating; cont walking exercise but discussed importance of adequate intensity of exercise; rec adding to the exercise plan - she will try to see whether she can resume playing tennis; also discussed portion size control and not eating late at nighttime; can discussed meds in the future if indicated; f/u in 3 mos      15. Depression  Assessment & Plan:  Secondary to life changes, huey with losing job and not playing tennis, thereby losing her social system; encouraged patient to reach out to friends to grab a meal or have social activity; rec resuming exercise plan in addition her usual walking; discussed importance of component of healthy nutrition, phys activity, sleep, meditation, social activities, counseling, and meds; can discuss meds in the future if no better        Time Documentation    Counseled patient  I spent  72 minutes  face to face on today's office visit. Time was spent reviewing patient's previous notes, lab results, test results, vitals, and/or other records as well as examining the patient, ordering tests/medicines/procedures, providing counseling, coordinating care, answering questions, discussing evaluation and treatment plans, and writing this note.     Total time: 72 minutes  (Level 5 60 minutes)       FOLLOW-UP  Health maintenance - rec flu vacc and COVID19 vacc, counseling given; rec Prevnar 20, counseling given; rec RSV vacc and Shingrix, counseling given; counseling re: updating mammo (last 12/7/23 [facility]) and DEXA; colonosc 11/17, repeat 2027 per Dr. Sharma  Needs fasting labs (last labs in Epic from 2021)  She will report back whether R hand pain recurs with playing tennis; if so, we will refer her to hand PT  RTC for PE 4/15/25; fasting labs prior to appt (CBC, CMP, TSH, lipids, UA/micro, FT4,  A1C)    ADDENDUM: Stable CBC, CMP (FG 96), TFTs, ESR, B12, folate, CPK except very abnl lipids (), bord CRP 0.52, bord vit D 25.6, bord LFTs, and contam UA - rec OV to discuss tx of very abnl lipids    Electronically signed by:    Noelle Deleon MD, FACP  01/14/2025

## 2025-01-14 ENCOUNTER — LAB (OUTPATIENT)
Dept: LAB | Facility: HOSPITAL | Age: 65
End: 2025-01-14
Payer: MEDICARE

## 2025-01-14 ENCOUNTER — OFFICE VISIT (OUTPATIENT)
Dept: INTERNAL MEDICINE | Facility: CLINIC | Age: 65
End: 2025-01-14
Payer: MEDICARE

## 2025-01-14 VITALS
BODY MASS INDEX: 32.64 KG/M2 | OXYGEN SATURATION: 96 % | HEIGHT: 63 IN | DIASTOLIC BLOOD PRESSURE: 84 MMHG | HEART RATE: 72 BPM | WEIGHT: 184.2 LBS | SYSTOLIC BLOOD PRESSURE: 112 MMHG

## 2025-01-14 DIAGNOSIS — R94.6 ABNORMAL THYROID FUNCTION TEST: ICD-10-CM

## 2025-01-14 DIAGNOSIS — E55.9 VITAMIN D DEFICIENCY: ICD-10-CM

## 2025-01-14 DIAGNOSIS — R73.01 IMPAIRED FASTING GLUCOSE: Primary | Chronic | ICD-10-CM

## 2025-01-14 DIAGNOSIS — E78.2 MIXED HYPERLIPIDEMIA: Chronic | ICD-10-CM

## 2025-01-14 DIAGNOSIS — M79.641 RIGHT HAND PAIN: ICD-10-CM

## 2025-01-14 DIAGNOSIS — R73.01 IMPAIRED FASTING GLUCOSE: ICD-10-CM

## 2025-01-14 DIAGNOSIS — E66.09 CLASS 1 OBESITY DUE TO EXCESS CALORIES WITH SERIOUS COMORBIDITY AND BODY MASS INDEX (BMI) OF 33.0 TO 33.9 IN ADULT: ICD-10-CM

## 2025-01-14 DIAGNOSIS — Z78.0 MENOPAUSE: Chronic | ICD-10-CM

## 2025-01-14 DIAGNOSIS — R74.8 ELEVATED LIVER ENZYMES: Chronic | ICD-10-CM

## 2025-01-14 DIAGNOSIS — R03.0 ELEVATED BLOOD PRESSURE READING WITHOUT DIAGNOSIS OF HYPERTENSION: Chronic | ICD-10-CM

## 2025-01-14 DIAGNOSIS — R20.0 NUMBNESS AND TINGLING OF RIGHT THUMB: ICD-10-CM

## 2025-01-14 DIAGNOSIS — R68.2 DRY MOUTH: ICD-10-CM

## 2025-01-14 DIAGNOSIS — M85.89 OSTEOPENIA OF MULTIPLE SITES: Chronic | ICD-10-CM

## 2025-01-14 DIAGNOSIS — Z71.85 VACCINE COUNSELING: ICD-10-CM

## 2025-01-14 DIAGNOSIS — Z23 NEED FOR COVID-19 VACCINE: ICD-10-CM

## 2025-01-14 DIAGNOSIS — R20.2 NUMBNESS AND TINGLING OF RIGHT THUMB: ICD-10-CM

## 2025-01-14 DIAGNOSIS — F32.9 REACTIVE DEPRESSION: ICD-10-CM

## 2025-01-14 DIAGNOSIS — M25.731 CARPAL BOSS OF RIGHT WRIST: Chronic | ICD-10-CM

## 2025-01-14 DIAGNOSIS — E66.811 CLASS 1 OBESITY DUE TO EXCESS CALORIES WITH SERIOUS COMORBIDITY AND BODY MASS INDEX (BMI) OF 33.0 TO 33.9 IN ADULT: ICD-10-CM

## 2025-01-14 DIAGNOSIS — K11.20 PAROTITIS: Chronic | ICD-10-CM

## 2025-01-14 DIAGNOSIS — G50.0 TRIGEMINAL NEURALGIA: ICD-10-CM

## 2025-01-14 DIAGNOSIS — Z00.00 ROUTINE PHYSICAL EXAMINATION: ICD-10-CM

## 2025-01-14 DIAGNOSIS — N95.2 POSTMENOPAUSAL ATROPHIC VAGINITIS: ICD-10-CM

## 2025-01-14 LAB
BASOPHILS # BLD AUTO: 0.06 10*3/MM3 (ref 0–0.2)
BASOPHILS NFR BLD AUTO: 0.9 % (ref 0–1.5)
DEPRECATED RDW RBC AUTO: 45.1 FL (ref 37–54)
EOSINOPHIL # BLD AUTO: 0.6 10*3/MM3 (ref 0–0.4)
EOSINOPHIL NFR BLD AUTO: 8.7 % (ref 0.3–6.2)
ERYTHROCYTE [DISTWIDTH] IN BLOOD BY AUTOMATED COUNT: 12.6 % (ref 12.3–15.4)
ERYTHROCYTE [SEDIMENTATION RATE] IN BLOOD: 14 MM/HR (ref 0–30)
EXPIRATION DATE: NORMAL
HBA1C MFR BLD: 5.6 % (ref 4.5–5.7)
HCT VFR BLD AUTO: 49.5 % (ref 34–46.6)
HGB BLD-MCNC: 16.7 G/DL (ref 12–15.9)
IMM GRANULOCYTES # BLD AUTO: 0.02 10*3/MM3 (ref 0–0.05)
IMM GRANULOCYTES NFR BLD AUTO: 0.3 % (ref 0–0.5)
LYMPHOCYTES # BLD AUTO: 2.06 10*3/MM3 (ref 0.7–3.1)
LYMPHOCYTES NFR BLD AUTO: 29.8 % (ref 19.6–45.3)
Lab: NORMAL
MCH RBC QN AUTO: 33 PG (ref 26.6–33)
MCHC RBC AUTO-ENTMCNC: 33.7 G/DL (ref 31.5–35.7)
MCV RBC AUTO: 97.8 FL (ref 79–97)
MONOCYTES # BLD AUTO: 0.53 10*3/MM3 (ref 0.1–0.9)
MONOCYTES NFR BLD AUTO: 7.7 % (ref 5–12)
NEUTROPHILS NFR BLD AUTO: 3.64 10*3/MM3 (ref 1.7–7)
NEUTROPHILS NFR BLD AUTO: 52.6 % (ref 42.7–76)
NRBC BLD AUTO-RTO: 0 /100 WBC (ref 0–0.2)
PLATELET # BLD AUTO: 237 10*3/MM3 (ref 140–450)
PMV BLD AUTO: 11.2 FL (ref 6–12)
RBC # BLD AUTO: 5.06 10*6/MM3 (ref 3.77–5.28)
WBC NRBC COR # BLD AUTO: 6.91 10*3/MM3 (ref 3.4–10.8)

## 2025-01-14 PROCEDURE — 82746 ASSAY OF FOLIC ACID SERUM: CPT

## 2025-01-14 PROCEDURE — 81001 URINALYSIS AUTO W/SCOPE: CPT

## 2025-01-14 PROCEDURE — 91320 SARSCV2 VAC 30MCG TRS-SUC IM: CPT | Performed by: INTERNAL MEDICINE

## 2025-01-14 PROCEDURE — 1159F MED LIST DOCD IN RCRD: CPT | Performed by: INTERNAL MEDICINE

## 2025-01-14 PROCEDURE — 83036 HEMOGLOBIN GLYCOSYLATED A1C: CPT

## 2025-01-14 PROCEDURE — 82306 VITAMIN D 25 HYDROXY: CPT

## 2025-01-14 PROCEDURE — 80061 LIPID PANEL: CPT

## 2025-01-14 PROCEDURE — 84443 ASSAY THYROID STIM HORMONE: CPT

## 2025-01-14 PROCEDURE — 86140 C-REACTIVE PROTEIN: CPT

## 2025-01-14 PROCEDURE — 3044F HG A1C LEVEL LT 7.0%: CPT | Performed by: INTERNAL MEDICINE

## 2025-01-14 PROCEDURE — 83036 HEMOGLOBIN GLYCOSYLATED A1C: CPT | Performed by: INTERNAL MEDICINE

## 2025-01-14 PROCEDURE — 86235 NUCLEAR ANTIGEN ANTIBODY: CPT

## 2025-01-14 PROCEDURE — 84439 ASSAY OF FREE THYROXINE: CPT

## 2025-01-14 PROCEDURE — 85652 RBC SED RATE AUTOMATED: CPT

## 2025-01-14 PROCEDURE — 85025 COMPLETE CBC W/AUTO DIFF WBC: CPT

## 2025-01-14 PROCEDURE — 86038 ANTINUCLEAR ANTIBODIES: CPT

## 2025-01-14 PROCEDURE — 1126F AMNT PAIN NOTED NONE PRSNT: CPT | Performed by: INTERNAL MEDICINE

## 2025-01-14 PROCEDURE — 1160F RVW MEDS BY RX/DR IN RCRD: CPT | Performed by: INTERNAL MEDICINE

## 2025-01-14 PROCEDURE — 82550 ASSAY OF CK (CPK): CPT

## 2025-01-14 PROCEDURE — 90480 ADMN SARSCOV2 VAC 1/ONLY CMP: CPT | Performed by: INTERNAL MEDICINE

## 2025-01-14 PROCEDURE — 82607 VITAMIN B-12: CPT

## 2025-01-14 PROCEDURE — 99215 OFFICE O/P EST HI 40 MIN: CPT | Performed by: INTERNAL MEDICINE

## 2025-01-14 PROCEDURE — G2212 PROLONG OUTPT/OFFICE VIS: HCPCS | Performed by: INTERNAL MEDICINE

## 2025-01-14 PROCEDURE — 80053 COMPREHEN METABOLIC PANEL: CPT

## 2025-01-14 RX ORDER — ESTRADIOL 0.1 MG/G
2 CREAM VAGINAL DAILY
Qty: 42.5 G | Refills: 2 | Status: SHIPPED | OUTPATIENT
Start: 2025-01-14

## 2025-01-14 NOTE — PROGRESS NOTES
Immunization  Immunization performed in left deltoid by Mickie Aragon MA. Patient tolerated the procedure well without complications.  01/14/25   Mickie Aragon MA

## 2025-01-15 ENCOUNTER — TELEPHONE (OUTPATIENT)
Dept: INTERNAL MEDICINE | Facility: CLINIC | Age: 65
End: 2025-01-15
Payer: COMMERCIAL

## 2025-01-15 PROBLEM — F32.9 REACTIVE DEPRESSION: Status: ACTIVE | Noted: 2025-01-15

## 2025-01-15 PROBLEM — N95.2 POSTMENOPAUSAL ATROPHIC VAGINITIS: Chronic | Status: ACTIVE | Noted: 2025-01-14

## 2025-01-15 PROBLEM — M25.731 CARPAL BOSS OF RIGHT WRIST: Status: ACTIVE | Noted: 2025-01-15

## 2025-01-15 PROBLEM — M50.30 DDD (DEGENERATIVE DISC DISEASE), CERVICAL: Chronic | Status: ACTIVE | Noted: 2025-01-12

## 2025-01-15 PROBLEM — E55.9 VITAMIN D DEFICIENCY: Status: ACTIVE | Noted: 2025-01-15

## 2025-01-15 PROBLEM — M25.731 CARPAL BOSS OF RIGHT WRIST: Chronic | Status: ACTIVE | Noted: 2025-01-15

## 2025-01-15 PROBLEM — E55.9 VITAMIN D DEFICIENCY: Chronic | Status: ACTIVE | Noted: 2025-01-15

## 2025-01-15 PROBLEM — M79.641 RIGHT HAND PAIN: Status: ACTIVE | Noted: 2025-01-15

## 2025-01-15 LAB
25(OH)D3 SERPL-MCNC: 25.6 NG/ML (ref 30–100)
ALBUMIN SERPL-MCNC: 4.3 G/DL (ref 3.5–5.2)
ALBUMIN/GLOB SERPL: 1.6 G/DL
ALP SERPL-CCNC: 59 U/L (ref 39–117)
ALT SERPL W P-5'-P-CCNC: 62 U/L (ref 1–33)
ANION GAP SERPL CALCULATED.3IONS-SCNC: 7.8 MMOL/L (ref 5–15)
AST SERPL-CCNC: 46 U/L (ref 1–32)
BACTERIA UR QL AUTO: ABNORMAL /HPF
BILIRUB SERPL-MCNC: 0.6 MG/DL (ref 0–1.2)
BILIRUB UR QL STRIP: NEGATIVE
BUN SERPL-MCNC: 16 MG/DL (ref 8–23)
BUN/CREAT SERPL: 19.5 (ref 7–25)
CALCIUM SPEC-SCNC: 9.5 MG/DL (ref 8.6–10.5)
CHLORIDE SERPL-SCNC: 103 MMOL/L (ref 98–107)
CHOLEST SERPL-MCNC: 312 MG/DL (ref 0–200)
CK SERPL-CCNC: 135 U/L (ref 20–180)
CLARITY UR: CLEAR
CO2 SERPL-SCNC: 28.2 MMOL/L (ref 22–29)
COLOR UR: YELLOW
CREAT SERPL-MCNC: 0.82 MG/DL (ref 0.57–1)
CRP SERPL-MCNC: 0.52 MG/DL (ref 0–0.5)
EGFRCR SERPLBLD CKD-EPI 2021: 79 ML/MIN/1.73
FOLATE SERPL-MCNC: 7.81 NG/ML (ref 4.78–24.2)
GLOBULIN UR ELPH-MCNC: 2.7 GM/DL
GLUCOSE SERPL-MCNC: 96 MG/DL (ref 65–99)
GLUCOSE UR STRIP-MCNC: NEGATIVE MG/DL
HBA1C MFR BLD: 5.6 % (ref 4.8–5.6)
HDLC SERPL-MCNC: 83 MG/DL (ref 40–60)
HGB UR QL STRIP.AUTO: NEGATIVE
HYALINE CASTS UR QL AUTO: ABNORMAL /LPF
KETONES UR QL STRIP: NEGATIVE
LDLC SERPL CALC-MCNC: 209 MG/DL (ref 0–100)
LDLC/HDLC SERPL: 2.48 {RATIO}
LEUKOCYTE ESTERASE UR QL STRIP.AUTO: ABNORMAL
NITRITE UR QL STRIP: NEGATIVE
PH UR STRIP.AUTO: 5.5 [PH] (ref 5–8)
POTASSIUM SERPL-SCNC: 4.5 MMOL/L (ref 3.5–5.2)
PROT SERPL-MCNC: 7 G/DL (ref 6–8.5)
PROT UR QL STRIP: NEGATIVE
RBC # UR STRIP: ABNORMAL /HPF
REF LAB TEST METHOD: ABNORMAL
SODIUM SERPL-SCNC: 139 MMOL/L (ref 136–145)
SP GR UR STRIP: 1.02 (ref 1–1.03)
SQUAMOUS #/AREA URNS HPF: ABNORMAL /HPF
T4 FREE SERPL-MCNC: 1.09 NG/DL (ref 0.92–1.68)
TRIGL SERPL-MCNC: 115 MG/DL (ref 0–150)
TSH SERPL DL<=0.05 MIU/L-ACNC: 1.88 UIU/ML (ref 0.27–4.2)
UROBILINOGEN UR QL STRIP: ABNORMAL
VIT B12 BLD-MCNC: 472 PG/ML (ref 211–946)
VLDLC SERPL-MCNC: 20 MG/DL (ref 5–40)
WBC # UR STRIP: ABNORMAL /HPF

## 2025-01-15 NOTE — TELEPHONE ENCOUNTER
----- Message from Noelle Deleon sent at 1/15/2025  9:54 AM EST -----  Normal thyroid tests, B12 and folic acid levels, electrolytes but abnormal cholesterol profile, borderline liver tests, and borderline vit D level.  Needs OV to review results and discuss tx plan (30-min)

## 2025-01-15 NOTE — TELEPHONE ENCOUNTER
Pt notified and verbalized understanding. She is scheduled for 1130 on 1/31/25 to go over labs and discuss med recs. Had to use 2 same days b/c there were not any 30 min slots available.

## 2025-01-16 LAB
ENA SS-A AB SER-ACNC: <0.2 AI (ref 0–0.9)
ENA SS-B AB SER-ACNC: <0.2 AI (ref 0–0.9)

## 2025-01-16 NOTE — ASSESSMENT & PLAN NOTE
Rec trying prescription estradiol vaginal cream 2x/week #42,5gm, 2RF; rec also using lubrication on both parties; advised would need to get compounded vaginal cream from GYN

## 2025-01-16 NOTE — PROGRESS NOTES
Great catch!  This is an initial Wellness because the medicare eff date is 05/01/2023.  The appointment type has been changed.     [place]

## 2025-01-16 NOTE — ASSESSMENT & PLAN NOTE
Patient's (Body mass index is 33.15 kg/m².) with stress and/or boredom eating; cont walking exercise but discussed importance of adequate intensity of exercise; rec adding to the exercise plan - she will try to see whether she can resume playing tennis; also discussed portion size control and not eating late at nighttime; can discussed meds in the future if indicated; f/u in 3 mos

## 2025-01-16 NOTE — ASSESSMENT & PLAN NOTE
R palmar 2nd metacarpal pain sugg of tendinitis, only occurring with playing tennis (when ball hits the racket); less likely d/t OA and carpal boss; has not played tennis for almost 6 mos so she wants to retry playing tennis to see if pain recurs; if it does, strongly recommend course of hand PT: if no better with hand PT, then rec MRI R hand for further evaluation

## 2025-01-16 NOTE — ASSESSMENT & PLAN NOTE
Secondary to life changes, huey with losing job and not playing tennis, thereby losing her social system; encouraged patient to reach out to friends to grab a meal or have social activity; rec resuming exercise plan in addition her usual walking; discussed importance of component of healthy nutrition, phys activity, sleep, meditation, social activities, counseling, and meds; can discuss meds in the future if no better

## 2025-01-17 LAB — ANA SER QL IF: NEGATIVE

## 2025-01-28 PROBLEM — M25.331: Chronic | Status: ACTIVE | Noted: 2022-05-03

## 2025-01-28 PROBLEM — M79.641 RIGHT HAND PAIN: Chronic | Status: ACTIVE | Noted: 2025-01-15

## 2025-01-28 PROBLEM — M54.2 NECK PAIN: Chronic | Status: ACTIVE | Noted: 2019-05-20

## 2025-01-28 NOTE — ASSESSMENT & PLAN NOTE
Bord LFTs with nl CPK; note also very abnl lipids with ; advised decreased saturated fats in diet and healthy wt loss, f/u LFTs in 3 mos

## 2025-01-28 NOTE — ASSESSMENT & PLAN NOTE
Mildly low vit D 25.6 with goal > 30; rec OTC vit D 1000 units QD supplementation or if she adds Ca/vit D supplement BID, that should be sufficient; f/u level in 3 mos

## 2025-01-28 NOTE — PROGRESS NOTES
"Chief Complaint   Patient presents with    Hyperlipidemia    Vitamin D Deficiency       History of Present Illness  64 y.o.  female presents for follow-up re: labs, specifically addressing cholesterol, vitamin D deficiency, and electrolytes. Reports genetics with high cholesterol. Has not taken cholesterol med previously.    Has started walking exercise. HR gets to 130-150s.    Review of Systems  Denies CP, SOB, abd pain, n/v. All other ROS reviewed and negative.    Current Outpatient Medications:   None, including OTC    VITALS:  /76   Pulse 70   Ht 158.8 cm (62.5\")   Wt 85.1 kg (187 lb 9.6 oz)   SpO2 94%   BMI 33.77 kg/m²     Physical Exam  Vitals and nursing note reviewed.   Constitutional:       General: She is not in acute distress.     Appearance: Normal appearance. She is not ill-appearing.   Eyes:      Extraocular Movements: Extraocular movements intact.      Conjunctiva/sclera: Conjunctivae normal.   Pulmonary:      Effort: Pulmonary effort is normal. No respiratory distress.   Neurological:      Mental Status: She is alert and oriented to person, place, and time. Mental status is at baseline.      Gait: Gait normal.   Psychiatric:         Mood and Affect: Mood normal.         Behavior: Behavior normal.         LABS  Results for orders placed or performed in visit on 01/14/25   Comprehensive Metabolic Panel    Collection Time: 01/14/25  1:39 PM    Specimen: Blood   Result Value Ref Range    Glucose 96 65 - 99 mg/dL    BUN 16 8 - 23 mg/dL    Creatinine 0.82 0.57 - 1.00 mg/dL    Sodium 139 136 - 145 mmol/L    Potassium 4.5 3.5 - 5.2 mmol/L    Chloride 103 98 - 107 mmol/L    CO2 28.2 22.0 - 29.0 mmol/L    Calcium 9.5 8.6 - 10.5 mg/dL    Total Protein 7.0 6.0 - 8.5 g/dL    Albumin 4.3 3.5 - 5.2 g/dL    ALT (SGPT) 62 (H) 1 - 33 U/L    AST (SGOT) 46 (H) 1 - 32 U/L    Alkaline Phosphatase 59 39 - 117 U/L    Total Bilirubin 0.6 0.0 - 1.2 mg/dL    Globulin 2.7 gm/dL    A/G Ratio 1.6 g/dL    " BUN/Creatinine Ratio 19.5 7.0 - 25.0    Anion Gap 7.8 5.0 - 15.0 mmol/L    eGFR 79.0 >60.0 mL/min/1.73   Lipid Panel    Collection Time: 01/14/25  1:39 PM    Specimen: Blood   Result Value Ref Range    Total Cholesterol 312 (H) 0 - 200 mg/dL    Triglycerides 115 0 - 150 mg/dL    HDL Cholesterol 83 (H) 40 - 60 mg/dL    LDL Cholesterol  209 (H) 0 - 100 mg/dL    VLDL Cholesterol 20 5 - 40 mg/dL    LDL/HDL Ratio 2.48    TSH    Collection Time: 01/14/25  1:39 PM    Specimen: Blood   Result Value Ref Range    TSH 1.880 0.270 - 4.200 uIU/mL   T4, Free    Collection Time: 01/14/25  1:39 PM    Specimen: Blood   Result Value Ref Range    Free T4 1.09 0.92 - 1.68 ng/dL   Hemoglobin A1c    Collection Time: 01/14/25  1:39 PM    Specimen: Blood   Result Value Ref Range    Hemoglobin A1C 5.60 4.80 - 5.60 %   Vitamin D,25-Hydroxy    Collection Time: 01/14/25  1:39 PM    Specimen: Blood   Result Value Ref Range    25 Hydroxy, Vitamin D 25.6 (L) 30.0 - 100.0 ng/ml   Vitamin B12    Collection Time: 01/14/25  1:39 PM    Specimen: Blood   Result Value Ref Range    Vitamin B-12 472 211 - 946 pg/mL   Folate    Collection Time: 01/14/25  1:39 PM    Specimen: Blood   Result Value Ref Range    Folate 7.81 4.78 - 24.20 ng/mL   CK    Collection Time: 01/14/25  1:39 PM    Specimen: Blood   Result Value Ref Range    Creatine Kinase 135 20 - 180 U/L   Sedimentation Rate    Collection Time: 01/14/25  1:39 PM    Specimen: Blood   Result Value Ref Range    Sed Rate 14 0 - 30 mm/hr   C-reactive Protein    Collection Time: 01/14/25  1:39 PM    Specimen: Blood   Result Value Ref Range    C-Reactive Protein 0.52 (H) 0.00 - 0.50 mg/dL   RANDY by IFA, Reflex to Titer and Pattern    Collection Time: 01/14/25  1:39 PM    Specimen: Blood   Result Value Ref Range    RANDY Negative    Sjogren's Antibody, Anti-SS-A / -SS-B    Collection Time: 01/14/25  1:39 PM    Specimen: Blood   Result Value Ref Range    Sjogren's Anti-SS-A <0.2 0.0 - 0.9 AI    Sjogren's  Anti-SS-B <0.2 0.0 - 0.9 AI   CBC Auto Differential    Collection Time: 01/14/25  1:39 PM    Specimen: Blood   Result Value Ref Range    WBC 6.91 3.40 - 10.80 10*3/mm3    RBC 5.06 3.77 - 5.28 10*6/mm3    Hemoglobin 16.7 (H) 12.0 - 15.9 g/dL    Hematocrit 49.5 (H) 34.0 - 46.6 %    MCV 97.8 (H) 79.0 - 97.0 fL    MCH 33.0 26.6 - 33.0 pg    MCHC 33.7 31.5 - 35.7 g/dL    RDW 12.6 12.3 - 15.4 %    RDW-SD 45.1 37.0 - 54.0 fl    MPV 11.2 6.0 - 12.0 fL    Platelets 237 140 - 450 10*3/mm3    Neutrophil % 52.6 42.7 - 76.0 %    Lymphocyte % 29.8 19.6 - 45.3 %    Monocyte % 7.7 5.0 - 12.0 %    Eosinophil % 8.7 (H) 0.3 - 6.2 %    Basophil % 0.9 0.0 - 1.5 %    Immature Grans % 0.3 0.0 - 0.5 %    Neutrophils, Absolute 3.64 1.70 - 7.00 10*3/mm3    Lymphocytes, Absolute 2.06 0.70 - 3.10 10*3/mm3    Monocytes, Absolute 0.53 0.10 - 0.90 10*3/mm3    Eosinophils, Absolute 0.60 (H) 0.00 - 0.40 10*3/mm3    Basophils, Absolute 0.06 0.00 - 0.20 10*3/mm3    Immature Grans, Absolute 0.02 0.00 - 0.05 10*3/mm3    nRBC 0.0 0.0 - 0.2 /100 WBC   Urinalysis without microscopic (no culture) - Urine, Clean Catch    Collection Time: 01/14/25  1:39 PM    Specimen: Urine, Clean Catch   Result Value Ref Range    Color, UA Yellow Yellow, Straw    Appearance, UA Clear Clear    pH, UA 5.5 5.0 - 8.0    Specific Gravity, UA 1.025 1.005 - 1.030    Glucose, UA Negative Negative    Ketones, UA Negative Negative    Bilirubin, UA Negative Negative    Blood, UA Negative Negative    Protein, UA Negative Negative    Leuk Esterase, UA Moderate (2+) (A) Negative    Nitrite, UA Negative Negative    Urobilinogen, UA 0.2 E.U./dL 0.2 - 1.0 E.U./dL   Urinalysis, Microscopic Only - Urine, Clean Catch    Collection Time: 01/14/25  1:39 PM    Specimen: Urine, Clean Catch   Result Value Ref Range    RBC, UA 0-2 None Seen, 0-2 /HPF    WBC, UA 21-50 (A) None Seen, 0-2 /HPF    Bacteria, UA Trace (A) None Seen /HPF    Squamous Epithelial Cells, UA 3-6 (A) None Seen, 0-2 /HPF     Hyaline Casts, UA 0-2 None Seen /LPF    Methodology Automated Microscopy      10/6/21     ASSESSMENT/PLAN    Diagnoses and all orders for this visit:    1. Hyperlipidemia (Primary)  Assessment & Plan:  Lipids very abnormal with  and also reviewed chronically elevated lipids, at least for 5 years; goal LDL < 130, ideally < 100; also note bord elevated LFTs; reviewed med options, goals, side effects, and risks; patient will proceed with rosuvastatin 10mg QD #30, 3RF; decrease saturated fats and cholesterol in the diet; repeat lipids with LFTs and CPK in 3 mos    Commended patient on starting walking exercise plan      Orders:  -     rosuvastatin (CRESTOR) 10 MG tablet; Take 1 tablet by mouth Daily.  Dispense: 30 tablet; Refill: 3    2. Vitamin D deficiency  Assessment & Plan:  Mildly low vit D 25.6 with goal > 30; rec OTC vit D 1000 units QD supplementation or if she adds Ca/vit D supplement BID, that should be sufficient; f/u level in 3 mos      3. Elevated liver enzymes  Assessment & Plan:  Bord LFTs with nl CPK; note also very abnl lipids with ; advised decreased saturated fats in diet and healthy wt loss, f/u LFTs in 3 mos      4. Right hand pain  Assessment & Plan:  Has not re-tried playing tennis yet; is waiting until the spring to be able to play outdoors      5. Vaccine counseling  Comments:  rec Prevnar 20; rec RSV vacc - get at pharmacy        FOLLOW-UP  Health maintenance - flu vacc and COVID19 vacc done for this season; rec RSV vacc and Prevnar 20, counseling given  RTC 4/15/25 as scheduled for updated wellness; fasting labs the week prior to appt (CBC, CMP, TSH, lipids, UA/micro, FT4, A1C, CPK, CRP, vit D)      Electronically signed by:    Noelle Deleon MD, FACP  01/31/2025

## 2025-01-28 NOTE — ASSESSMENT & PLAN NOTE
Lipids very abnormal with  and also reviewed chronically elevated lipids, at least for 5 years; goal LDL < 130, ideally < 100; also note bord elevated LFTs; reviewed med options, goals, side effects, and risks; patient will proceed with rosuvastatin 10mg QD #30, 3RF; decrease saturated fats and cholesterol in the diet; repeat lipids with LFTs and CPK in 3 mos    Commended patient on starting walking exercise plan

## 2025-01-31 ENCOUNTER — OFFICE VISIT (OUTPATIENT)
Dept: INTERNAL MEDICINE | Facility: CLINIC | Age: 65
End: 2025-01-31
Payer: MEDICARE

## 2025-01-31 VITALS
OXYGEN SATURATION: 94 % | HEIGHT: 63 IN | HEART RATE: 70 BPM | DIASTOLIC BLOOD PRESSURE: 76 MMHG | SYSTOLIC BLOOD PRESSURE: 108 MMHG | BODY MASS INDEX: 33.24 KG/M2 | WEIGHT: 187.6 LBS

## 2025-01-31 DIAGNOSIS — M79.641 RIGHT HAND PAIN: Chronic | ICD-10-CM

## 2025-01-31 DIAGNOSIS — R74.8 ELEVATED LIVER ENZYMES: Chronic | ICD-10-CM

## 2025-01-31 DIAGNOSIS — Z71.85 VACCINE COUNSELING: ICD-10-CM

## 2025-01-31 DIAGNOSIS — E78.2 MIXED HYPERLIPIDEMIA: Primary | Chronic | ICD-10-CM

## 2025-01-31 DIAGNOSIS — E55.9 VITAMIN D DEFICIENCY: Chronic | ICD-10-CM

## 2025-01-31 PROCEDURE — G2211 COMPLEX E/M VISIT ADD ON: HCPCS | Performed by: INTERNAL MEDICINE

## 2025-01-31 PROCEDURE — 99214 OFFICE O/P EST MOD 30 MIN: CPT | Performed by: INTERNAL MEDICINE

## 2025-01-31 PROCEDURE — 1126F AMNT PAIN NOTED NONE PRSNT: CPT | Performed by: INTERNAL MEDICINE

## 2025-01-31 RX ORDER — ROSUVASTATIN CALCIUM 10 MG/1
10 TABLET, COATED ORAL DAILY
Qty: 30 TABLET | Refills: 3 | Status: SHIPPED | OUTPATIENT
Start: 2025-01-31

## 2025-03-20 DIAGNOSIS — L71.9 ROSACEA: Chronic | ICD-10-CM

## 2025-03-20 DIAGNOSIS — R73.01 IMPAIRED FASTING GLUCOSE: Chronic | ICD-10-CM

## 2025-03-20 DIAGNOSIS — Z00.00 PE (PHYSICAL EXAM), ROUTINE: Primary | Chronic | ICD-10-CM

## 2025-03-20 DIAGNOSIS — E78.2 MIXED HYPERLIPIDEMIA: Chronic | ICD-10-CM

## 2025-03-20 DIAGNOSIS — E55.9 VITAMIN D DEFICIENCY: Chronic | ICD-10-CM

## 2025-04-08 ENCOUNTER — HOSPITAL ENCOUNTER (OUTPATIENT)
Dept: BONE DENSITY | Facility: HOSPITAL | Age: 65
Discharge: HOME | End: 2025-04-08
Payer: MEDICARE

## 2025-04-08 ENCOUNTER — LAB (OUTPATIENT)
Dept: LAB | Facility: HOSPITAL | Age: 65
End: 2025-04-08
Payer: MEDICARE

## 2025-04-08 DIAGNOSIS — Z00.00 PE (PHYSICAL EXAM), ROUTINE: ICD-10-CM

## 2025-04-08 DIAGNOSIS — L71.9 ROSACEA: Chronic | ICD-10-CM

## 2025-04-08 DIAGNOSIS — Z78.0 MENOPAUSE: Chronic | ICD-10-CM

## 2025-04-08 DIAGNOSIS — R73.01 IMPAIRED FASTING GLUCOSE: Chronic | ICD-10-CM

## 2025-04-08 DIAGNOSIS — E78.2 MIXED HYPERLIPIDEMIA: Chronic | ICD-10-CM

## 2025-04-08 DIAGNOSIS — E55.9 VITAMIN D DEFICIENCY: Chronic | ICD-10-CM

## 2025-04-08 PROBLEM — F32.9 REACTIVE DEPRESSION: Chronic | Status: ACTIVE | Noted: 2025-01-15

## 2025-04-08 PROBLEM — D75.1 POLYCYTHEMIA: Status: ACTIVE | Noted: 2025-04-08

## 2025-04-08 LAB
25(OH)D3 SERPL-MCNC: 51.4 NG/ML (ref 30–100)
ALBUMIN SERPL-MCNC: 4.2 G/DL (ref 3.5–5.2)
ALBUMIN/GLOB SERPL: 1.6 G/DL
ALP SERPL-CCNC: 64 U/L (ref 39–117)
ALT SERPL W P-5'-P-CCNC: 49 U/L (ref 1–33)
ANION GAP SERPL CALCULATED.3IONS-SCNC: 12.6 MMOL/L (ref 5–15)
AST SERPL-CCNC: 36 U/L (ref 1–32)
BACTERIA UR QL AUTO: ABNORMAL /HPF
BASOPHILS # BLD AUTO: 0.05 10*3/MM3 (ref 0–0.2)
BASOPHILS NFR BLD AUTO: 0.9 % (ref 0–1.5)
BILIRUB SERPL-MCNC: 0.7 MG/DL (ref 0–1.2)
BILIRUB UR QL STRIP: NEGATIVE
BUN SERPL-MCNC: 14 MG/DL (ref 8–23)
BUN/CREAT SERPL: 14.1 (ref 7–25)
CALCIUM SPEC-SCNC: 9.5 MG/DL (ref 8.6–10.5)
CHLORIDE SERPL-SCNC: 106 MMOL/L (ref 98–107)
CHOLEST SERPL-MCNC: 214 MG/DL (ref 0–200)
CK SERPL-CCNC: 158 U/L (ref 20–180)
CLARITY UR: CLEAR
CO2 SERPL-SCNC: 23.4 MMOL/L (ref 22–29)
COLOR UR: YELLOW
CREAT SERPL-MCNC: 0.99 MG/DL (ref 0.57–1)
CRP SERPL-MCNC: 0.19 MG/DL (ref 0.01–0.5)
DEPRECATED RDW RBC AUTO: 44 FL (ref 37–54)
EGFRCR SERPLBLD CKD-EPI 2021: 63 ML/MIN/1.73
EOSINOPHIL # BLD AUTO: 0.41 10*3/MM3 (ref 0–0.4)
EOSINOPHIL NFR BLD AUTO: 7 % (ref 0.3–6.2)
ERYTHROCYTE [DISTWIDTH] IN BLOOD BY AUTOMATED COUNT: 12.3 % (ref 12.3–15.4)
GLOBULIN UR ELPH-MCNC: 2.6 GM/DL
GLUCOSE SERPL-MCNC: 87 MG/DL (ref 65–99)
GLUCOSE UR STRIP-MCNC: NEGATIVE MG/DL
HBA1C MFR BLD: 5.4 % (ref 4.8–5.6)
HCT VFR BLD AUTO: 51.6 % (ref 34–46.6)
HDLC SERPL-MCNC: 71 MG/DL (ref 40–60)
HGB BLD-MCNC: 17.1 G/DL (ref 12–15.9)
HGB UR QL STRIP.AUTO: NEGATIVE
HYALINE CASTS UR QL AUTO: ABNORMAL /LPF
IMM GRANULOCYTES # BLD AUTO: 0.01 10*3/MM3 (ref 0–0.05)
IMM GRANULOCYTES NFR BLD AUTO: 0.2 % (ref 0–0.5)
KETONES UR QL STRIP: ABNORMAL
LDLC SERPL CALC-MCNC: 124 MG/DL (ref 0–100)
LDLC/HDLC SERPL: 1.71 {RATIO}
LEUKOCYTE ESTERASE UR QL STRIP.AUTO: ABNORMAL
LYMPHOCYTES # BLD AUTO: 1.94 10*3/MM3 (ref 0.7–3.1)
LYMPHOCYTES NFR BLD AUTO: 33.3 % (ref 19.6–45.3)
MCH RBC QN AUTO: 32.6 PG (ref 26.6–33)
MCHC RBC AUTO-ENTMCNC: 33.1 G/DL (ref 31.5–35.7)
MCV RBC AUTO: 98.3 FL (ref 79–97)
MONOCYTES # BLD AUTO: 0.52 10*3/MM3 (ref 0.1–0.9)
MONOCYTES NFR BLD AUTO: 8.9 % (ref 5–12)
NEUTROPHILS NFR BLD AUTO: 2.9 10*3/MM3 (ref 1.7–7)
NEUTROPHILS NFR BLD AUTO: 49.7 % (ref 42.7–76)
NITRITE UR QL STRIP: NEGATIVE
NRBC BLD AUTO-RTO: 0 /100 WBC (ref 0–0.2)
PH UR STRIP.AUTO: <=5 [PH] (ref 5–8)
PLATELET # BLD AUTO: 191 10*3/MM3 (ref 140–450)
PMV BLD AUTO: 11.8 FL (ref 6–12)
POTASSIUM SERPL-SCNC: 4.1 MMOL/L (ref 3.5–5.2)
PROT SERPL-MCNC: 6.8 G/DL (ref 6–8.5)
PROT UR QL STRIP: NEGATIVE
RBC # BLD AUTO: 5.25 10*6/MM3 (ref 3.77–5.28)
RBC # UR STRIP: ABNORMAL /HPF
REF LAB TEST METHOD: ABNORMAL
SODIUM SERPL-SCNC: 142 MMOL/L (ref 136–145)
SP GR UR STRIP: 1.02 (ref 1–1.03)
SQUAMOUS #/AREA URNS HPF: ABNORMAL /HPF
T4 FREE SERPL-MCNC: 1.19 NG/DL (ref 0.92–1.68)
TRIGL SERPL-MCNC: 107 MG/DL (ref 0–150)
TSH SERPL DL<=0.05 MIU/L-ACNC: 2.26 UIU/ML (ref 0.27–4.2)
UROBILINOGEN UR QL STRIP: ABNORMAL
VLDLC SERPL-MCNC: 19 MG/DL (ref 5–40)
WBC # UR STRIP: ABNORMAL /HPF
WBC NRBC COR # BLD AUTO: 5.83 10*3/MM3 (ref 3.4–10.8)

## 2025-04-08 PROCEDURE — 81001 URINALYSIS AUTO W/SCOPE: CPT

## 2025-04-08 PROCEDURE — 84443 ASSAY THYROID STIM HORMONE: CPT

## 2025-04-08 PROCEDURE — 77080 DXA BONE DENSITY AXIAL: CPT

## 2025-04-08 PROCEDURE — 86141 C-REACTIVE PROTEIN HS: CPT

## 2025-04-08 PROCEDURE — 80061 LIPID PANEL: CPT

## 2025-04-08 PROCEDURE — 83036 HEMOGLOBIN GLYCOSYLATED A1C: CPT

## 2025-04-08 PROCEDURE — 82550 ASSAY OF CK (CPK): CPT

## 2025-04-08 PROCEDURE — 80053 COMPREHEN METABOLIC PANEL: CPT

## 2025-04-08 PROCEDURE — 85025 COMPLETE CBC W/AUTO DIFF WBC: CPT

## 2025-04-08 PROCEDURE — 84439 ASSAY OF FREE THYROXINE: CPT

## 2025-04-08 PROCEDURE — 82306 VITAMIN D 25 HYDROXY: CPT

## 2025-04-09 ENCOUNTER — RESULTS FOLLOW-UP (OUTPATIENT)
Dept: INTERNAL MEDICINE | Facility: CLINIC | Age: 65
End: 2025-04-09
Payer: COMMERCIAL

## 2025-04-10 NOTE — TELEPHONE ENCOUNTER
Acute symptomatic discussed with the patient brat diet keep well hydration monitor for fever if no improvement to call the office  Orders:  •  ondansetron (ZOFRAN) 4 mg tablet; Take 1 tablet (4 mg total) by mouth every 8 (eight) hours as needed for nausea or vomiting   Mailed results to patient

## 2025-04-14 NOTE — ASSESSMENT & PLAN NOTE
LFTs improved, note also significantly improved lipids with  down to 124; cont working on consistent phys activity and healthy wt loss; f/u LFTs in 6 mos for serial examination

## 2025-04-14 NOTE — ASSESSMENT & PLAN NOTE
Recent DEXA showed worse T-score -1.6, reviewed Ca/vit D supplementation and rec weight-bearing exercises; repeat DEXA in 2 yrs (4/2027)

## 2025-04-14 NOTE — ASSESSMENT & PLAN NOTE
Stable vit D level 51.4; rec changing K/D3 supplement to Ca/vit D supplementation; f/u level in 6 mos

## 2025-04-14 NOTE — PROGRESS NOTES
ANNUAL WELLNESS VISIT    DRUG AND ALCOHOL USE      no alcohol use, no tobacco use, and caffeine intake: 2 cups of caffeinated coffee per day    DIET AND PHYSICAL ACTIVITY     Diet: general    Exercise: daily   Exercise Details: walking    MOOD DISORDER AND COGNITIVE SCREENING     PHQ-2 Depression Screening - components reviewed with patient; screening is negative for active depression.    Little interest or pleasure in doing things? Not at all   Feeling down, depressed, or hopeless? Not at all   PHQ-2 Total Score 0       Anxiety Screening Tool Used YES     AUDIT screening  0     Mini-Cog Performed   Yes    1. Tell Patient 3 Words Light wind tree    2. Administer Clock Test normal    3. Recall 3 words  Light wind tree    4. Number Correct Items 3    FUNCTIONAL ABILITY AND LEVEL OF SAFETY   Hearing no hearing loss     Wears Hearing Aids No       Current Activities Independent      none  - see Funct/Cog Status Intake     Fall Risk Assessment       Has difficulty with walking or balance  No         Timed Up and Go (TUG) Test  8 sec.       If >12 sec, normal    ADVANCED DIRECTIVE  Advance Care Planning   ACP discussion was held with the patient during this visit. Patient does not have an advance directive, information provided.  Advance Care Planning Discussion:  16 min or more spent on counseling; patient does not have advanced directive and living will.  Reviewed desires for end of life care, which is to have comfort care.  Patient does not want extraordinary life-sustaining measures, including no prolonged artificial life support. Encouraged patient to ensure family is aware of desires/preferences. Confirmed  Don is her healthcare surrogate.  ACP pamphlet provided to patient. Sections to be completed were reviewed in detail. Selections were preliminarily selected by the patient after counseling and discussion. Advised patient to give copy to the healthcare surrogate and also bring copy for [facility] records  once document is notarized. Patient had no other questions/concerns today.      PAIN SCREENING Do you have pain right now? no      If so, 1-10 scale: 0          Do you have pain every day? No      Probable chronic pain: No     Recent Hospitalizations:  No hospitalization(s) within the last year..     MEDICATION REVIEW   - updated and reviewed (see Medication List).   - reviewed for potentially harmful drug-disease interactions in the elderly.   - reviewed for high risk medications in the elderly.   - aspirin use: No    BMI  Body mass index is 32.95 kg/m².  BMI is >= 30 and <35. (Class 1 Obesity). The following options were offered after discussion;: exercise counseling/recommendations and nutrition counseling/recommendations     __________________________________  Chief Complaint   Patient presents with    Medicare Wellness-Initial Visit    Hyperlipidemia    Impaired fasting glucose       History of Present Illness  64 y.o.  female presents for initial wellness visit and f/u on sugars, cholesterol, and hand pain. Has increased phys activity and decreased eating late at nighttime.     Reports recurrent hand pain, only with playing tennis. Planning on trying pickleball. No hand pain with other ADLs. No assoc'd numbness/tingling.     Has been taking vit K2/D3 supplement (has 5000 units vit D); not on calcium supplementation.     Reports multiple siblings all with cholesterol issues.    Has question what to do if parotitis recurs; reports h/o about 7 episodes already.    Review of Systems  Denies headaches, visual changes, CP, palpitations, SOB, cough, abd pain, n/v/d, difficulty with urination, numbness/tingling, falls, mood changes, lightheadedness, hearing changes, rashes.  ROS (+) for persistent R hand pain, only with tennis.  Denies vaginal discharge or bleeding (no periods) or breast concerns.    All other ROS reviewed and negative.    Current Outpatient Medications:     rosuvastatin 10 MG QD  Vitamin  "K2/D3 5000 units QD    OPIOID SCREENING:  The patient does not have opioid prescription on her medication list. Medication list has been reviewed with the patient regarding potentially high risk medications and harmful drug interactions in patients over age 65.    VITALS:  /82   Pulse 57   Ht 158.1 cm (62.25\")   Wt 82.4 kg (181 lb 9.6 oz)   SpO2 94%   BMI 32.95 kg/m²     Physical Exam  Vitals and nursing note reviewed.   Constitutional:       General: She is not in acute distress.     Appearance: Normal appearance. She is well-developed. She is not ill-appearing.      Comments: Down 6 lbs over last 3 mos   HENT:      Head: Normocephalic.      Right Ear: Tympanic membrane, ear canal and external ear normal. There is no impacted cerumen.      Left Ear: Tympanic membrane, ear canal and external ear normal. There is no impacted cerumen.      Nose: Nose normal.   Eyes:      Extraocular Movements: Extraocular movements intact.      Conjunctiva/sclera: Conjunctivae normal.      Pupils: Pupils are equal, round, and reactive to light.   Neck:      Vascular: No carotid bruit (bilaterally).   Cardiovascular:      Rate and Rhythm: Normal rate and regular rhythm.      Heart sounds: Normal heart sounds.   Pulmonary:      Effort: Pulmonary effort is normal. No respiratory distress.      Breath sounds: Normal breath sounds. No wheezing, rhonchi or rales.   Abdominal:      General: Bowel sounds are normal. There is no distension.      Palpations: Abdomen is soft. There is no mass.      Tenderness: There is no abdominal tenderness.   Genitourinary:     Comments: Chaperone declined by patient.    Breast exam unremarkable without masses, skin changes, nipple discharge, or axillary adenopathy.      Musculoskeletal:      Cervical back: Normal range of motion and neck supple.      Right lower leg: No edema.      Left lower leg: No edema.   Lymphadenopathy:      Cervical: No cervical adenopathy.   Skin:     General: Skin is warm " and dry.      Findings: Rash (minimal erythema under the breasts bilaterally; no maceration, pruritus, or tenderness) present.   Neurological:      Mental Status: She is alert and oriented to person, place, and time.      Gait: Gait normal.   Psychiatric:         Mood and Affect: Mood normal.         Behavior: Behavior normal.         LABS  Results for orders placed or performed in visit on 04/08/25   Comprehensive Metabolic Panel    Collection Time: 04/08/25 11:10 AM    Specimen: Blood   Result Value Ref Range    Glucose 87 65 - 99 mg/dL    BUN 14 8 - 23 mg/dL    Creatinine 0.99 0.57 - 1.00 mg/dL    Sodium 142 136 - 145 mmol/L    Potassium 4.1 3.5 - 5.2 mmol/L    Chloride 106 98 - 107 mmol/L    CO2 23.4 22.0 - 29.0 mmol/L    Calcium 9.5 8.6 - 10.5 mg/dL    Total Protein 6.8 6.0 - 8.5 g/dL    Albumin 4.2 3.5 - 5.2 g/dL    ALT (SGPT) 49 (H) 1 - 33 U/L    AST (SGOT) 36 (H) 1 - 32 U/L    Alkaline Phosphatase 64 39 - 117 U/L    Total Bilirubin 0.7 0.0 - 1.2 mg/dL    Globulin 2.6 gm/dL    A/G Ratio 1.6 g/dL    BUN/Creatinine Ratio 14.1 7.0 - 25.0    Anion Gap 12.6 5.0 - 15.0 mmol/L    eGFR 63.0 >60.0 mL/min/1.73   Lipid Panel    Collection Time: 04/08/25 11:10 AM    Specimen: Blood   Result Value Ref Range    Total Cholesterol 214 (H) 0 - 200 mg/dL    Triglycerides 107 0 - 150 mg/dL    HDL Cholesterol 71 (H) 40 - 60 mg/dL    LDL Cholesterol  124 (H) 0 - 100 mg/dL    VLDL Cholesterol 19 5 - 40 mg/dL    LDL/HDL Ratio 1.71    TSH    Collection Time: 04/08/25 11:10 AM    Specimen: Blood   Result Value Ref Range    TSH 2.260 0.270 - 4.200 uIU/mL   T4, Free    Collection Time: 04/08/25 11:10 AM    Specimen: Blood   Result Value Ref Range    Free T4 1.19 0.92 - 1.68 ng/dL   Hemoglobin A1c    Collection Time: 04/08/25 11:10 AM    Specimen: Blood   Result Value Ref Range    Hemoglobin A1C 5.40 4.80 - 5.60 %   CK    Collection Time: 04/08/25 11:10 AM    Specimen: Blood   Result Value Ref Range    Creatine Kinase 158 20 - 180 U/L    High Sensitivity CRP    Collection Time: 04/08/25 11:10 AM    Specimen: Blood   Result Value Ref Range    HS C-Reactive Protein 0.188 0.010 - 0.500 mg/dL   Vitamin D,25-Hydroxy    Collection Time: 04/08/25 11:10 AM    Specimen: Blood   Result Value Ref Range    25 Hydroxy, Vitamin D 51.4 30.0 - 100.0 ng/ml   CBC Auto Differential    Collection Time: 04/08/25 11:10 AM    Specimen: Blood   Result Value Ref Range    WBC 5.83 3.40 - 10.80 10*3/mm3    RBC 5.25 3.77 - 5.28 10*6/mm3    Hemoglobin 17.1 (H) 12.0 - 15.9 g/dL    Hematocrit 51.6 (H) 34.0 - 46.6 %    MCV 98.3 (H) 79.0 - 97.0 fL    MCH 32.6 26.6 - 33.0 pg    MCHC 33.1 31.5 - 35.7 g/dL    RDW 12.3 12.3 - 15.4 %    RDW-SD 44.0 37.0 - 54.0 fl    MPV 11.8 6.0 - 12.0 fL    Platelets 191 140 - 450 10*3/mm3    Neutrophil % 49.7 42.7 - 76.0 %    Lymphocyte % 33.3 19.6 - 45.3 %    Monocyte % 8.9 5.0 - 12.0 %    Eosinophil % 7.0 (H) 0.3 - 6.2 %    Basophil % 0.9 0.0 - 1.5 %    Immature Grans % 0.2 0.0 - 0.5 %    Neutrophils, Absolute 2.90 1.70 - 7.00 10*3/mm3    Lymphocytes, Absolute 1.94 0.70 - 3.10 10*3/mm3    Monocytes, Absolute 0.52 0.10 - 0.90 10*3/mm3    Eosinophils, Absolute 0.41 (H) 0.00 - 0.40 10*3/mm3    Basophils, Absolute 0.05 0.00 - 0.20 10*3/mm3    Immature Grans, Absolute 0.01 0.00 - 0.05 10*3/mm3    nRBC 0.0 0.0 - 0.2 /100 WBC   Urinalysis without microscopic (no culture) - Urine, Clean Catch    Collection Time: 04/08/25 11:10 AM    Specimen: Urine, Clean Catch   Result Value Ref Range    Color, UA Yellow Yellow, Straw    Appearance, UA Clear Clear    pH, UA <=5.0 5.0 - 8.0    Specific Gravity, UA 1.023 1.005 - 1.030    Glucose, UA Negative Negative    Ketones, UA Trace (A) Negative    Bilirubin, UA Negative Negative    Blood, UA Negative Negative    Protein, UA Negative Negative    Leuk Esterase, UA Small (1+) (A) Negative    Nitrite, UA Negative Negative    Urobilinogen, UA 0.2 E.U./dL 0.2 - 1.0 E.U./dL   Urinalysis, Microscopic Only - Urine, Clean  Catch    Collection Time: 04/08/25 11:10 AM    Specimen: Urine, Clean Catch   Result Value Ref Range    RBC, UA 0-2 None Seen, 0-2 /HPF    WBC, UA 6-10 (A) None Seen, 0-2 /HPF    Bacteria, UA None Seen None Seen /HPF    Squamous Epithelial Cells, UA 3-6 (A) None Seen, 0-2 /HPF    Hyaline Casts, UA None Seen None Seen /LPF    Methodology Automated Microscopy      1/14/25 A1C 5.6, AST 46, ALT 62, ,       ECG 12 Lead    Date/Time: 4/15/2025 12:57 PM  Performed by: Noelle Deleon MD    Authorized by: Noelle Deleon MD  Comparison: not compared with previous ECG   Previous ECG: no previous ECG available  Rhythm: sinus rhythm and sinus bradycardia  Rate: normal  BPM: 54  Conduction: conduction normal  ST Segments: ST segments normal  T Waves: T waves normal  T inversion: III  QRS axis: normal        ASSESSMENT/PLAN    Diagnoses and all orders for this visit:    1. Medicare annual wellness visit, initial (Primary)  Assessment & Plan:  Health maintenance - COVID19 1/25; flu 10/24; rec RSV, counseling given (get at pharmacy); rec Prevnar 20, counseling given (done today); Tdap 2/22, (next Tdap due 2032) HAV and Shingrix done; mammo 2/21/25 ([facility]); no further Paps unless abnlities; DEXA 4/25, repeat 2027; colonosc 11/17, repeat 2027 per Dr. Sharma, eye exam Qyr; dental Q6 mos; (+) seat belt    Consultants:  Patient Care Team:  Noelle Deleon MD as PCP - General (Internal Medicine)  Jennifer Moeller MD as Consulting Physician (General Surgery)  Jai Sharma MD as Consulting Physician (Gastroenterology)  Sandra Mora MD as Consulting Physician (Dermatology)  Phoebe Johnson APRN as Nurse Practitioner (Bariatrics)  Renita Cardenas APRN as Nurse Practitioner (Neurology)  Mitul Dasilva MD as Consulting Physician (Hand Surgery)  Scout Narayan MD as Consulting Physician (Otolaryngology)        2. Impaired fasting glucose  Assessment & Plan:  BG control stable with A1C  5.4; encouraged reg phys activity to decr insulin resistance, moderation in unhealthy starches/sweets; f/u A1C in 6 mos      Orders:  -     Hemoglobin A1c; Future    3. Hyperlipidemia  Assessment & Plan:  Lipids significantly improved with ; note also improved LFTs; commended patient on healthy dietary changes; cont rosuvastatin 10mg QD #90, 3RF    Orders:  -     rosuvastatin (CRESTOR) 10 MG tablet; Take 1 tablet by mouth Daily.  Dispense: 90 tablet; Refill: 3  -     ECG 12 Lead    4. Vitamin D deficiency  Assessment & Plan:  Stable vit D level 51.4; rec changing K/D3 supplement to Ca/vit D supplementation; f/u level in 6 mos    Orders:  -     Vitamin D,25-Hydroxy; Future    5. Elevated blood pressure reading without diagnosis of hypertension  Assessment & Plan:  BP remains stable 122/82; goal < 130/80; rec low Na diet and reg aerobic exercise; f/u in 6 mos      6. Elevated liver enzymes  Assessment & Plan:  LFTs improved, note also significantly improved lipids with  down to 124; cont working on consistent phys activity and healthy wt loss; f/u LFTs in 6 mos for serial examination    Orders:  -     Hepatic Function Panel; Future    7. Osteopenia  Assessment & Plan:  Recent DEXA showed worse T-score -1.6, reviewed Ca/vit D supplementation and rec weight-bearing exercises; repeat DEXA in 2 yrs (4/2027)      8. Right hand pain  Assessment & Plan:  Recurrent with trying to play tennis; patient is planning on trying pickleball; patient amenable to proceeding with hand PT; reports h/o Xray showing arthritis changes; rec MRI if no better with PT    Orders:  -     Ambulatory Referral to Physical Therapy for Evaluation & Treatment    9. Obesity  Assessment & Plan:  Patient's (Body mass index is 32.95 kg/m².) indicates that they are obese (BMI >30) with health conditions that include impaired fasting glucose, dyslipidemias, osteoarthritis, and elevated LFTs  . Cont working on consistency with aerobic exercise,  portion size control, balance of carbs vs proteins. Commended patient on 6 lbs wt loss over the last 3 mos. F/u in 6 mos      10. Need for pneumococcal 20-valent conjugate vaccination  -     Pneumococcal Conjugate Vaccine 20-Valent (PCV20)    11. Parotitis  Assessment & Plan:  Rec contacting ENT 1st asap if parotitis sxs recur      12. Candidiasis of breast  Assessment & Plan:  Not active currently but with upcoming spring/summer warmer weather, rec proactively keeping skin under breasts dry; ok to use antifungal powder and/or antifungal cream as needed if sxs flare-up          FOLLOW-UP  RTC 6 mos with A1C, LFTs, vit D (escribed)    Electronically signed by:    Noelle Deleon MD, FACP  04/15/2025

## 2025-04-14 NOTE — ASSESSMENT & PLAN NOTE
Patient's (Body mass index is 32.95 kg/m².) indicates that they are obese (BMI >30) with health conditions that include impaired fasting glucose, dyslipidemias, osteoarthritis, and elevated LFTs  . Cont working on consistency with aerobic exercise, portion size control, balance of carbs vs proteins. Commended patient on 6 lbs wt loss over the last 3 mos. F/u in 6 mos

## 2025-04-14 NOTE — ASSESSMENT & PLAN NOTE
Health maintenance - COVID19 1/25; flu 10/24; rec RSV, counseling given (get at pharmacy); rec Prevnar 20, counseling given (done today); Tdap 2/22, (next Tdap due 2032) HAV and Shingrix done; mammo 2/21/25 ([facility]); no further Paps unless abnlities; DEXA 4/25, repeat 2027; colonosc 11/17, repeat 2027 per Dr. Sharma, eye exam Qyr; dental Q6 mos; (+) seat belt    Consultants:  Patient Care Team:  Noelle Deleon MD as PCP - General (Internal Medicine)  Jennifer Moeller MD as Consulting Physician (General Surgery)  Jai Sharma MD as Consulting Physician (Gastroenterology)  Sandra Mora MD as Consulting Physician (Dermatology)  Phoebe Johnson APRN as Nurse Practitioner (Bariatrics)  Renita Cardenas APRN as Nurse Practitioner (Neurology)  Mitul Dasilva MD as Consulting Physician (Hand Surgery)  Scout Narayan MD as Consulting Physician (Otolaryngology)

## 2025-04-14 NOTE — ASSESSMENT & PLAN NOTE
Lipids significantly improved with ; note also improved LFTs; commended patient on healthy dietary changes; cont rosuvastatin 10mg QD #90, 3RF

## 2025-04-15 ENCOUNTER — OFFICE VISIT (OUTPATIENT)
Dept: INTERNAL MEDICINE | Facility: CLINIC | Age: 65
End: 2025-04-15
Payer: MEDICARE

## 2025-04-15 VITALS
OXYGEN SATURATION: 94 % | BODY MASS INDEX: 33.42 KG/M2 | HEIGHT: 62 IN | HEART RATE: 57 BPM | WEIGHT: 181.6 LBS | SYSTOLIC BLOOD PRESSURE: 122 MMHG | DIASTOLIC BLOOD PRESSURE: 82 MMHG

## 2025-04-15 DIAGNOSIS — M79.641 RIGHT HAND PAIN: Chronic | ICD-10-CM

## 2025-04-15 DIAGNOSIS — M85.89 OSTEOPENIA OF MULTIPLE SITES: Chronic | ICD-10-CM

## 2025-04-15 DIAGNOSIS — Z00.00 MEDICARE ANNUAL WELLNESS VISIT, INITIAL: Primary | ICD-10-CM

## 2025-04-15 DIAGNOSIS — R74.8 ELEVATED LIVER ENZYMES: Chronic | ICD-10-CM

## 2025-04-15 DIAGNOSIS — Z23 NEED FOR PNEUMOCOCCAL 20-VALENT CONJUGATE VACCINATION: ICD-10-CM

## 2025-04-15 DIAGNOSIS — E66.811 CLASS 1 OBESITY DUE TO EXCESS CALORIES WITH SERIOUS COMORBIDITY AND BODY MASS INDEX (BMI) OF 33.0 TO 33.9 IN ADULT: Chronic | ICD-10-CM

## 2025-04-15 DIAGNOSIS — R03.0 ELEVATED BLOOD PRESSURE READING WITHOUT DIAGNOSIS OF HYPERTENSION: Chronic | ICD-10-CM

## 2025-04-15 DIAGNOSIS — K11.20 PAROTITIS: Chronic | ICD-10-CM

## 2025-04-15 DIAGNOSIS — E78.2 MIXED HYPERLIPIDEMIA: Chronic | ICD-10-CM

## 2025-04-15 DIAGNOSIS — R73.01 IMPAIRED FASTING GLUCOSE: Chronic | ICD-10-CM

## 2025-04-15 DIAGNOSIS — E55.9 VITAMIN D DEFICIENCY: Chronic | ICD-10-CM

## 2025-04-15 DIAGNOSIS — B37.89 CANDIDIASIS OF BREAST: ICD-10-CM

## 2025-04-15 DIAGNOSIS — E66.09 CLASS 1 OBESITY DUE TO EXCESS CALORIES WITH SERIOUS COMORBIDITY AND BODY MASS INDEX (BMI) OF 33.0 TO 33.9 IN ADULT: Chronic | ICD-10-CM

## 2025-04-15 RX ORDER — ROSUVASTATIN CALCIUM 10 MG/1
10 TABLET, COATED ORAL DAILY
Qty: 90 TABLET | Refills: 3 | Status: SHIPPED | OUTPATIENT
Start: 2025-04-15

## 2025-04-15 NOTE — PROGRESS NOTES
Immunization  Immunization performed in left deltoid by Mickie Aragon CMA. Patient tolerated the procedure well without complications.  04/15/25   Mickie Aragon CMA

## 2025-04-16 PROBLEM — B37.89 CANDIDIASIS OF BREAST: Status: ACTIVE | Noted: 2025-04-16

## 2025-04-16 NOTE — ASSESSMENT & PLAN NOTE
Recurrent with trying to play tennis; patient is planning on trying pickleball; patient amenable to proceeding with hand PT; reports h/o Xray showing arthritis changes; rec MRI if no better with PT

## 2025-04-16 NOTE — ASSESSMENT & PLAN NOTE
Not active currently but with upcoming spring/summer warmer weather, rec proactively keeping skin under breasts dry; ok to use antifungal powder and/or antifungal cream as needed if sxs flare-up

## 2025-04-29 ENCOUNTER — TREATMENT (OUTPATIENT)
Dept: PHYSICAL THERAPY | Facility: CLINIC | Age: 65
End: 2025-04-29
Payer: MEDICARE

## 2025-04-29 DIAGNOSIS — M24.9 DERANGEMENT OF HAND JOINT: ICD-10-CM

## 2025-04-29 DIAGNOSIS — S60.221A CONTUSION OF RIGHT HAND, INITIAL ENCOUNTER: ICD-10-CM

## 2025-04-29 DIAGNOSIS — M79.641 RIGHT HAND PAIN: Primary | ICD-10-CM

## 2025-04-29 PROCEDURE — 97140 MANUAL THERAPY 1/> REGIONS: CPT | Performed by: PHYSICAL THERAPIST

## 2025-04-29 PROCEDURE — 97035 APP MDLTY 1+ULTRASOUND EA 15: CPT | Performed by: PHYSICAL THERAPIST

## 2025-04-29 PROCEDURE — G0283 ELEC STIM OTHER THAN WOUND: HCPCS | Performed by: PHYSICAL THERAPIST

## 2025-04-29 PROCEDURE — 97162 PT EVAL MOD COMPLEX 30 MIN: CPT | Performed by: PHYSICAL THERAPIST

## 2025-04-29 NOTE — PROGRESS NOTES
Physical Therapy Initial Evaluation and Plan of Care  [facility] [place]  [address] [facility] [address] [address], [address] [address]        Patient: Varsha Lockhart   : 1960  Diagnosis/ICD-10 Code:  Right hand pain [M79.641]  Referring practitioner: Noelle Deleon MD  Date of Initial Visit: 2025  Today's Date: 2025  Patient seen for 1 sessions         Visit Diagnoses:    ICD-10-CM ICD-9-CM   1. Right hand pain  M79.641 729.5         Subjective Questionnaire: QuickDASH: 13.64%    Subjective Evaluation    History of Present Illness  Mechanism of injury: Right hand started about 2019.  Since then she has been experiencing pain with tennis.  Has been playing tennis she was very young.  She has seen [facility].  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 Her family member in [place], injected the 1st CMC joint dorsally. Thumb pain severe for 6 weeks after that.      CURRENT COMPLAINT  Intermittent pain right thenar eminence. Nerve/shocking type pain. Worse with hitting the tennis ball hard or a slice. Gripping racket is OK if she is not hitting the ball.  No Paraesthesia/anaesthesia.      Pain  Current pain ratin  At best pain ratin  At worst pain rating: 10  Progression: no change    Hand dominance: right           Objective          Palpation     Right Tenderness of the intrinsics.     Right Wrist/Hand Comments  Right intrinsics: Thenar eminence..     Neurological Testing     Sensation     Wrist/Hand     Right   Intact: light touch, pin prick and sharp/dull discrimination    Active Range of Motion     Right Elbow   Normal active range of motion    Left Wrist   Wrist flexion: 85 degrees   Wrist extension: 85 degrees     Right Wrist   Wrist flexion: 70 degrees   Wrist extension: 72 degrees   Radial deviation: 25 degrees   Ulnar deviation: 40 degrees     Additional Active Range of Motion Details  Full composite fist.    Passive Range of Motion     Right Shoulder   Normal passive range of  motion    Strength/Myotome Testing     Left Wrist/Hand      (2nd hand position)     Trial 1: 67 lbs    Trial 2: 70 lbs    Trial 3: 66 lbs    Average: 67.67 lbs  Left  strength (2nd hand position) 67 lbs    Thumb Strength  Key/Lateral Pinch     Trial 1: 14 lbs    Trial 2: 15 lbs    Trial 3: 17 lbs    Average: 15.33 lbs  Tip/Two-Point Pinch     Trial 1: 12 lbs    Trial 2: 14 lbs    Trial 3: 15 lbs    Average: 13.67 lbs  Palmar/Three-Point Pinch     Trial 1: 16 lbs    Trial 2: 16 lbs    Trial 3: 14 lbs    Average: 15.33 lbs    Right Wrist/Hand      (2nd hand position)     Trial 1: 64 lbs    Trial 2: 65 lbs    Trial 3: 56 lbs    Average: 61.67 lbs  Right  strength (2nd hand position) 64 lbs    Thumb Strength   Key/Lateral Pinch     Trial 1: 13 lbs    Trial 2: 16 lbs    Trial 3: 16 lbs    Average: 15 lbs  Tip/Two-Point Pinch     Trial 1: 10 lbs    Trial 2: 12 lbs    Trial 3: 11 lbs    Average: 11 lbs  Palmar/Three-Point Pinch     Trial 1: 12 lbs    Trial 2: 13 lbs    Trial 3: 14 lbs    Average: 13 lbs    Tests   Cervical     Right   Positive ULTT1 and ULTT2.     Right Elbow   Negative Tinel's sign (cubital tunnel).     Right Wrist/Hand   Positive Phalen's sign (Dull pain into thenar eminence.).   Negative CMC grind, CMC shoulder sign and Tinel's sign (medial nerve).           Assessment & Plan       Assessment  Impairments: activity intolerance, impaired physical strength and pain with function   Functional limitations: pushing and uncomfortable because of pain   Assessment details: 64 year old right handed active female presenting with chronic right thenar eminence pain.  The quality of pain and tenderness suggest a digital nerve compression intermittently, and/or possible tear of an intrinsic muscle of tendon attachment that may have scarred down abnormally.  Neurological exam is normal however there is some median nerve neural tension.  Prognosis: good    Goals  Plan Goals: Short-term goals to be met  in 4 weeks  1.  Patient reports worst pain level during tennis 6/10.  2.  Patient has negative neural tension signs of the median nerve in the right hand.  3.  Patient reports a 50% decrease in the severity of tenderness at the thenar eminence.  4.  Patient has improved hand function so that QuickDASH score improves to 10%.  Long-term goes to bed in 12 weeks  1.  Patient is independent with each phase home exercise program.  2.  Patient is able to return to tennis without discomfort.  3.  Patient has improved hand function so the QuickDASH score improved to 0.  4.  Patient has  strength is stronger on the right than left.    Plan  Therapy options: will be seen for skilled therapy services  Planned modality interventions: cryotherapy, dry needling, high voltage pulsed current (pain management), high voltage pulsed current (spasm management) and ultrasound  Planned therapy interventions: fine motor coordination training, flexibility, functional ROM exercises, compression, home exercise program, joint mobilization, therapeutic activities, stretching, strengthening, soft tissue mobilization, orthotic fitting/training, neuromuscular re-education and manual therapy  Frequency: 2x week  Duration in weeks: 12        Timed:         Manual Therapy:    20     mins  [id number];     Therapeutic Exercise:         mins  28445;     Neuromuscular April:        mins  58855;    Therapeutic Activity:          mins  04842;     Gait Training:           mins  66878;     Ultrasound:     13     mins  94087;    Ionto                                   mins   89357  Self Care                            mins   77206  Canalith Repos        mins 06324      Un-Timed:  Electrical Stimulation:    20     mins  69656 ( );  Dry Needling          mins self-pay  Traction          mins 16483  Low Eval          Mins  95723  Mod Eval     25     Mins  23557  High Eval                            Mins  79455        Timed Treatment:   33   mins   Total  Treatment:     78   mins          PT: Rafi Calderon PT, T     License Number: [id number]  Electronically signed by Rafi Calderon PT, 04/29/25, 1:01 PM EDT    Medicare Initial Certification  Certification Period: 7/28/2025  I certify that the therapy services are furnished while this patient is under my care.  The services outlined above are required by this patient, and will be reviewed every 90 days.     PHYSICIAN: ________________________________________________________  Noelle Deleon MD        DATE: ____________________________________________________________    Please sign and return via fax to [phone]. Thank you, [facility].

## 2025-05-01 ENCOUNTER — TREATMENT (OUTPATIENT)
Dept: PHYSICAL THERAPY | Facility: CLINIC | Age: 65
End: 2025-05-01
Payer: MEDICARE

## 2025-05-01 DIAGNOSIS — M24.9 DERANGEMENT OF HAND JOINT: ICD-10-CM

## 2025-05-01 DIAGNOSIS — S60.221A CONTUSION OF RIGHT HAND, INITIAL ENCOUNTER: ICD-10-CM

## 2025-05-01 DIAGNOSIS — M79.641 RIGHT HAND PAIN: Primary | ICD-10-CM

## 2025-05-01 NOTE — PROGRESS NOTES
Physical Therapy Daily Treatment Note  [facility] [place] [facility] [place]  [address]  [facility] [address]      Patient: Varsha Lockhart   : 1960  Diagnosis/ICD-10 Code:  Right hand pain [M79.641]  Referring practitioner: Noelle Deleon MD  Date of Initial Visit: Type: THERAPY  Noted: 2025  Today's Date: 2025  Patient seen for 2 sessions         Visit Diagnoses:    ICD-10-CM ICD-9-CM   1. Right hand pain  M79.641 729.5   2. Contusion of right hand, initial encounter  S60.221A 923.20   3. Derangement of hand joint  M24.9 718.94       Subjective   Varsha Lockhart reports: not sore from 1st treatment.      Objective   OBSERVATION: No visible edema.  PALPATION: Still very tender at the thenar eminence right hand.  ROM: Wrist and thumb range of motion within normal limits.  STRENGTH: Opponens pollicis 5/5, abductor pollicis 5/5, pollicis flexor brevis 5/5.      See Exercise, Manual, and Modality Logs for complete treatment.       Assessment/Plan  Patient strength would indicate no acute tears.  However she could have simply chronic in the year or even a nerve impingement from perhaps older scarring since it has been this long since time of injury and she continues to have symptoms.  We need her to hit a tennis ball some to see if symptoms have changed with the first couple treatments.  Progress per Plan of Care and Progress strengthening /stabilization /functional activity           Timed:         Manual Therapy:    16     mins  48343;     Therapeutic Exercise:    5     mins  18210;     Neuromuscular April:        mins  65003;    Therapeutic Activity:          mins  05053;     Gait Training:           mins  93733;     Ultrasound:     13     mins  71132;    Ionto                                   mins   74255  Self Care                            mins   87306  Canalith Repos         mins 19716      Un-Timed:  Electrical Stimulation:    20     mins  84887 ( );  Dry Needling           mins self-pay  Traction          mins 27366      Timed Treatment:   34   mins   Total Treatment:     54   mins    aRfi Calderon PT, CHT  [place] License: [id number]

## 2025-05-06 ENCOUNTER — TREATMENT (OUTPATIENT)
Dept: PHYSICAL THERAPY | Facility: CLINIC | Age: 65
End: 2025-05-06
Payer: MEDICARE

## 2025-05-06 ENCOUNTER — TELEPHONE (OUTPATIENT)
Dept: INTERNAL MEDICINE | Facility: CLINIC | Age: 65
End: 2025-05-06
Payer: COMMERCIAL

## 2025-05-06 DIAGNOSIS — M25.331 CARPAL INSTABILITY OF RIGHT WRIST: Chronic | ICD-10-CM

## 2025-05-06 DIAGNOSIS — S60.221S CONTUSION OF RIGHT HAND, SEQUELA: ICD-10-CM

## 2025-05-06 DIAGNOSIS — M79.641 RIGHT HAND PAIN: Primary | Chronic | ICD-10-CM

## 2025-05-06 DIAGNOSIS — M25.731 CARPAL BOSS OF RIGHT WRIST: Chronic | ICD-10-CM

## 2025-05-06 DIAGNOSIS — M79.641 RIGHT HAND PAIN: Primary | ICD-10-CM

## 2025-05-06 DIAGNOSIS — M24.9 DERANGEMENT OF HAND JOINT: ICD-10-CM

## 2025-05-06 NOTE — TELEPHONE ENCOUNTER
Patient advised of message      ----- Message from Noelle Deleon sent at 5/6/2025  1:00 PM EDT -----  Please advise patient that physical therapy wants her to get an updated right hand x-ray.  This has been ordered.  No appointment needed to get the x-ray; just go to a [facility] diagnostic facility

## 2025-05-06 NOTE — Clinical Note
Please advise patient that physical therapy wants her to get an updated right hand x-ray.  This has been ordered.  No appointment needed to get the x-ray; just go to a [facility] diagnostic facility

## 2025-05-06 NOTE — PROGRESS NOTES
Physical Therapy Daily Treatment Note  [facility] [place]  [address]  [facility] [address]      Patient: Varsha Lockhart   : 1960  Diagnosis/ICD-10 Code:  Right hand pain [M79.641]  Referring practitioner: Noelle Deleon MD  Date of Initial Visit: Type: THERAPY  Noted: 2025  Today's Date: 2025  Patient seen for 3 sessions         Visit Diagnoses:    ICD-10-CM ICD-9-CM   1. Right hand pain  M79.641 729.5   2. Contusion of right hand, sequela  S60.221S 906.3   3. Derangement of hand joint  M24.9 718.94       Subjective   Varsha Lockhart reports: First 3 to 5 minutes of tennis was okay but when she started to hit harder and off for hand with top spent or slicing start experiencing sharp pain into the dorsal aspect of the thumb and first webspace over to the index finger, and shortly after that developed pain into the volar side of the thenar eminence.    Objective   OBSERVATION: Carpal boss noted along the index finger metacarpal line.  PALPATION: Seems to be some tenderness between the index metacarpal base and the carpals.  There also seems to be some tenderness at the DRUJ.  ROM: Full range of motion.  STRENGTH: Resisted supination and pronation had no effect on symptoms.    See Exercise, Manual, and Modality Logs for complete treatment.       Assessment/Plan  Still unsure where the symptoms are originating from.  But they appear to be quite debilitating for the patient when she is trying to play the recreational sport in which she enjoys.  At this point no hypothesis on the source of pain.  Hoping x-rays will be helpful.  Progress per Plan of Care and Progress strengthening /stabilization /functional activity           Timed:         Manual Therapy:    15     mins  61387;     Therapeutic Exercise:    14     mins  19724;     Neuromuscular April:        mins  33856;    Therapeutic Activity:          mins  11706;     Gait Training:           mins  16150;      Ultrasound:     15     mins  54813;    Ionto                                   mins   14520  Self Care                            mins   61675  Canalith Repos         mins 42943      Un-Timed:  Electrical Stimulation:    15     mins  28143 ( );  Dry Needling          mins self-pay  Traction          mins 01198      Timed Treatment:   44   mins   Total Treatment:     59   mins    Rafi Calderon PT, CHT  [place] License: [id number]

## 2025-05-08 ENCOUNTER — TREATMENT (OUTPATIENT)
Dept: PHYSICAL THERAPY | Facility: CLINIC | Age: 65
End: 2025-05-08
Payer: MEDICARE

## 2025-05-08 DIAGNOSIS — S60.221S CONTUSION OF RIGHT HAND, SEQUELA: ICD-10-CM

## 2025-05-08 DIAGNOSIS — M24.9 DERANGEMENT OF HAND JOINT: ICD-10-CM

## 2025-05-08 DIAGNOSIS — M79.641 RIGHT HAND PAIN: Primary | ICD-10-CM

## 2025-05-08 NOTE — PROGRESS NOTES
Physical Therapy Daily Treatment Note  [facility] [place] [facility] [place]  [address]  [facility] [address]      Patient: Varsha Lockhart   : 1960  Diagnosis/ICD-10 Code:  Right hand pain [M79.641]  Referring practitioner: Noelle Deleon MD  Date of Initial Visit: Type: THERAPY  Noted: 2025  Today's Date: 2025  Patient seen for 4 sessions         Visit Diagnoses:    ICD-10-CM ICD-9-CM   1. Right hand pain  M79.641 729.5   2. Contusion of right hand, sequela  S60.221S 906.3   3. Derangement of hand joint  M24.9 718.94       Subjective   Varsha Lockhart reports: Soreness after last treatment.  Pain seems to be more on the dorsal side between the index and the thumb joint.    Objective   OBSERVATION: No visible edema.  Can still see a area of carpal boss.  PALPATION: Just radial's to the carpal boss is where the patient is most tender.  ROM: Wrist range of motion is functional.  Some endrange discomfort with extension.  STRENGTH: Wrist extension 5/5, wrist flexion 5/5.  OTHER: First [facility] compression/grind test negative.    See Exercise, Manual, and Modality Logs for complete treatment.       Assessment/Plan  Patient is most tender at the area around the index finger carpometacarpal junction.  I am thinking this is the area where the pain may be originating from.  X-rays will be helpful, and eliminating some possible diagnoses.  Progress per Plan of Care and Progress strengthening /stabilization /functional activity           Timed:         Manual Therapy:    15     mins  98958;     Therapeutic Exercise:    13     mins  41566;     Neuromuscular April:        mins  55732;    Therapeutic Activity:          mins  43171;     Gait Training:           mins  59147;     Ultrasound:     15     mins  93076;    Ionto                                   mins   69756  Self Care                            mins   30486  Canalith Repos         mins 06712      Un-Timed:  Electrical Stimulation:    20     mins   55926 ( );  Dry Needling          mins self-pay  Traction          mins 12117      Timed Treatment:   42   mins   Total Treatment:     62   mins    Rafi Calderon PT, CHT  [place] License: [id number]

## 2025-05-09 ENCOUNTER — HOSPITAL ENCOUNTER (OUTPATIENT)
Dept: GENERAL RADIOLOGY | Facility: HOSPITAL | Age: 65
Discharge: HOME | End: 2025-05-09
Payer: MEDICARE

## 2025-05-09 DIAGNOSIS — M25.731 CARPAL BOSS OF RIGHT WRIST: Chronic | ICD-10-CM

## 2025-05-09 DIAGNOSIS — M79.641 RIGHT HAND PAIN: Chronic | ICD-10-CM

## 2025-05-09 DIAGNOSIS — M25.331 CARPAL INSTABILITY OF RIGHT WRIST: Chronic | ICD-10-CM

## 2025-05-09 PROCEDURE — 73130 X-RAY EXAM OF HAND: CPT

## 2025-05-09 PROCEDURE — 73110 X-RAY EXAM OF WRIST: CPT

## 2025-05-12 ENCOUNTER — RESULTS FOLLOW-UP (OUTPATIENT)
Dept: INTERNAL MEDICINE | Facility: CLINIC | Age: 65
End: 2025-05-12
Payer: COMMERCIAL

## 2025-05-13 ENCOUNTER — TREATMENT (OUTPATIENT)
Dept: PHYSICAL THERAPY | Facility: CLINIC | Age: 65
End: 2025-05-13
Payer: MEDICARE

## 2025-05-13 DIAGNOSIS — M79.641 RIGHT HAND PAIN: Primary | ICD-10-CM

## 2025-05-13 DIAGNOSIS — S60.221S CONTUSION OF RIGHT HAND, SEQUELA: ICD-10-CM

## 2025-05-13 DIAGNOSIS — M24.9 DERANGEMENT OF HAND JOINT: ICD-10-CM

## 2025-05-13 NOTE — PROGRESS NOTES
Physical Therapy Daily Treatment Note  [facility] [place] [facility] [place]  [address]  [facility] [address]      Patient: Varsha Lockhart   : 1960  Diagnosis/ICD-10 Code:  Right hand pain [M79.641]  Referring practitioner: Noelle Deleon MD  Date of Initial Visit: Type: THERAPY  Noted: 2025  Today's Date: 2025  Patient seen for 5 sessions         Visit Diagnoses:    ICD-10-CM ICD-9-CM   1. Right hand pain  M79.641 729.5   2. Contusion of right hand, sequela  S60.221S 906.3   3. Derangement of hand joint  M24.9 718.94       Subjective   Varsha Lockhart reports: Try to play tennis this weekend.  Immediately hitting the ball she start experiencing pain throughout the thumb area and the thenar eminence.    Objective   OBSERVATION: Carpal boss proximal index finger metacarpal area.  PALPATION: Most tender area is between the base of the index metacarpal and trapezium, dorsal side.  Patient also tender along the lumbricals of the index finger/thumb and the thumb thenar eminence along its metacarpal attachment.  ROM: Wrist range of motion is within functional limits.  Some endrange discomfort with full extension.  STRENGTH: Wrist 5/5.  OTHER: First [facility] compression test negative.  SL ballottement test negative.    See Exercise, Manual, and Modality Logs for complete treatment.       Assessment/Plan  Still difficult to determine what the patient's issue is.  X-rays are relatively normal.  There is some tenderness around the trapezium and index finger metacarpal area.  And still some moderate tenderness into the soft tissue the thenar eminence.  Overall no change in symptoms.  Progress per Plan of Care and Progress strengthening /stabilization /functional activity           Timed:         Manual Therapy:    26     mins  18960;     Therapeutic Exercise:         mins  22324;     Neuromuscular April:        mins  34964;    Therapeutic Activity:          mins  51117;     Gait Training:            mins  61267;     Ultrasound:     15     mins  87584;    Ionto                                   mins   30048  Self Care                            mins   48312  Canalith Repos         mins 29147      Un-Timed:  Electrical Stimulation:     15    mins  43478 ( );  Dry Needling          mins self-pay  Traction          mins 65259      Timed Treatment:   41   mins   Total Treatment:     56   mins    Rafi Calderon PT, CHT  [place] License: [id number]

## 2025-05-16 ENCOUNTER — TREATMENT (OUTPATIENT)
Dept: PHYSICAL THERAPY | Facility: CLINIC | Age: 65
End: 2025-05-16
Payer: MEDICARE

## 2025-05-16 DIAGNOSIS — S60.221S CONTUSION OF RIGHT HAND, SEQUELA: ICD-10-CM

## 2025-05-16 DIAGNOSIS — M79.641 RIGHT HAND PAIN: Primary | ICD-10-CM

## 2025-05-16 DIAGNOSIS — M24.9 DERANGEMENT OF HAND JOINT: ICD-10-CM

## 2025-05-16 NOTE — PROGRESS NOTES
Physical Therapy Daily Treatment Note  [facility] [place] [facility] [place] [facility] [place]  [address]  [facility] [address]      Patient: Varsha Lockhart   : 1960  Diagnosis/ICD-10 Code:  Right hand pain [M79.641]  Referring practitioner: Noelel Deleon MD  Date of Initial Visit: Type: THERAPY  Noted: 2025  Today's Date: 2025  Patient seen for 6 sessions         Visit Diagnoses:    ICD-10-CM ICD-9-CM   1. Right hand pain  M79.641 729.5   2. Contusion of right hand, sequela  S60.221S 906.3   3. Derangement of hand joint  M24.9 718.94       Subjective   Varsha Lockhart reports: No noticeable change since last visit.    Objective   OBSERVATION: Carpal boss proximal index finger metacarpal area.  PALPATION: Most tender area is between the base of the index metacarpal and trapezium, dorsal side.    ROM: Wrist range of motion is within functional limits.   STRENGTH: Wrist 5/5.  OTHER: First [facility] compression test negative.  SL ballottement test negative.  See Exercise, Manual, and Modality Logs for complete treatment.       Assessment/Plan  Able to reproduce the symptoms with flex bar supination and pronation.  Still somewhat baffled as to where the discomfort is coming from.  If there is no improvements this week we may recommend hand surgeon.  Progress per Plan of Care and Progress strengthening /stabilization /functional activity           Timed:         Manual Therapy:    15     mins  52840;     Therapeutic Exercise:    10     mins  72509;     Neuromuscular April:        mins  53237;    Therapeutic Activity:     18     mins  61177;     Gait Training:           mins  40326;     Ultrasound:     10     mins  21325;    Ionto                                   mins   49391  Self Care                            mins   06210  Canalith Repos         mins 63397      Un-Timed:  Electrical Stimulation:    20     mins  77409 ( );  Dry Needling          mins self-pay  Traction          mins 59530      Timed  Treatment:   53   mins   Total Treatment:     73   mins    Rafi Calderon PT, CHT  [place] License: [id number]

## 2025-06-23 ENCOUNTER — OFFICE VISIT (OUTPATIENT)
Dept: INTERNAL MEDICINE | Facility: CLINIC | Age: 65
End: 2025-06-23
Payer: MEDICARE

## 2025-06-23 VITALS
DIASTOLIC BLOOD PRESSURE: 90 MMHG | SYSTOLIC BLOOD PRESSURE: 134 MMHG | OXYGEN SATURATION: 96 % | BODY MASS INDEX: 32.39 KG/M2 | HEART RATE: 56 BPM | WEIGHT: 176 LBS | HEIGHT: 62 IN

## 2025-06-23 DIAGNOSIS — M79.641 RIGHT HAND PAIN: Primary | Chronic | ICD-10-CM

## 2025-06-23 PROCEDURE — 1125F AMNT PAIN NOTED PAIN PRSNT: CPT | Performed by: INTERNAL MEDICINE

## 2025-06-23 PROCEDURE — 1159F MED LIST DOCD IN RCRD: CPT | Performed by: INTERNAL MEDICINE

## 2025-06-23 PROCEDURE — G2211 COMPLEX E/M VISIT ADD ON: HCPCS | Performed by: INTERNAL MEDICINE

## 2025-06-23 PROCEDURE — 99213 OFFICE O/P EST LOW 20 MIN: CPT | Performed by: INTERNAL MEDICINE

## 2025-06-23 PROCEDURE — 1160F RVW MEDS BY RX/DR IN RCRD: CPT | Performed by: INTERNAL MEDICINE

## 2025-06-23 NOTE — PROGRESS NOTES
"Chief Complaint   Patient presents with    Right Hand Pain        History of Present Illness  65 y.o.  female presents for follow-up re: persistent hand pain, now ongoing > 1-1/2 years without specific injury.    Has recently had course of physical therapy 2x/week or several weeks. She reports being able to do the exercises, pain not reproduced unless certain activities, specifically hitting the tennis ball hard.  Has tried playing pickle ball and only has mild soreness; same with gripping the handle of the lawnmower hand; tolerable pain. Notes some secondary weakness due to the pain; patient is R-handed.    Patient is at the R index finger spreading to the hand; wrist is intact.    Reports remote injury to the R thumb that required surgery. Was functionally intact without pain until recent R hand pain, which was not precipitated with a specific injury.    Review of Systems  ROS (+) for persistent R hand pain with secondary weakness as noted. Denies numbness/tingling. All other ROS reviewed and negative.    Current Outpatient Medications:     rosuvastatin 10 MG QD    Vitamin D-Vitamin K QD    VITALS:  /90   Pulse 56   Ht 158.1 cm (62.25\")   Wt 79.8 kg (176 lb)   SpO2 96%   BMI 31.93 kg/m²     Physical Exam  Vitals and nursing note reviewed.   Constitutional:       General: She is not in acute distress.     Appearance: Normal appearance. She is not ill-appearing.   Eyes:      Extraocular Movements: Extraocular movements intact.      Conjunctiva/sclera: Conjunctivae normal.   Pulmonary:      Effort: Pulmonary effort is normal. No respiratory distress.   Musculoskeletal:      Comments: R hand fingers FROM   Neurological:      Mental Status: She is alert. Mental status is at baseline.   Psychiatric:         Mood and Affect: Mood normal.         Behavior: Behavior normal.         LABS  Results for orders placed or performed in visit on 04/08/25   Comprehensive Metabolic Panel    Collection Time: " 04/08/25 11:10 AM    Specimen: Blood   Result Value Ref Range    Glucose 87 65 - 99 mg/dL    BUN 14 8 - 23 mg/dL    Creatinine 0.99 0.57 - 1.00 mg/dL    Sodium 142 136 - 145 mmol/L    Potassium 4.1 3.5 - 5.2 mmol/L    Chloride 106 98 - 107 mmol/L    CO2 23.4 22.0 - 29.0 mmol/L    Calcium 9.5 8.6 - 10.5 mg/dL    Total Protein 6.8 6.0 - 8.5 g/dL    Albumin 4.2 3.5 - 5.2 g/dL    ALT (SGPT) 49 (H) 1 - 33 U/L    AST (SGOT) 36 (H) 1 - 32 U/L    Alkaline Phosphatase 64 39 - 117 U/L    Total Bilirubin 0.7 0.0 - 1.2 mg/dL    Globulin 2.6 gm/dL    A/G Ratio 1.6 g/dL    BUN/Creatinine Ratio 14.1 7.0 - 25.0    Anion Gap 12.6 5.0 - 15.0 mmol/L    eGFR 63.0 >60.0 mL/min/1.73   Lipid Panel    Collection Time: 04/08/25 11:10 AM    Specimen: Blood   Result Value Ref Range    Total Cholesterol 214 (H) 0 - 200 mg/dL    Triglycerides 107 0 - 150 mg/dL    HDL Cholesterol 71 (H) 40 - 60 mg/dL    LDL Cholesterol  124 (H) 0 - 100 mg/dL    VLDL Cholesterol 19 5 - 40 mg/dL    LDL/HDL Ratio 1.71    TSH    Collection Time: 04/08/25 11:10 AM    Specimen: Blood   Result Value Ref Range    TSH 2.260 0.270 - 4.200 uIU/mL   T4, Free    Collection Time: 04/08/25 11:10 AM    Specimen: Blood   Result Value Ref Range    Free T4 1.19 0.92 - 1.68 ng/dL   Hemoglobin A1c    Collection Time: 04/08/25 11:10 AM    Specimen: Blood   Result Value Ref Range    Hemoglobin A1C 5.40 4.80 - 5.60 %   CK    Collection Time: 04/08/25 11:10 AM    Specimen: Blood   Result Value Ref Range    Creatine Kinase 158 20 - 180 U/L   High Sensitivity CRP    Collection Time: 04/08/25 11:10 AM    Specimen: Blood   Result Value Ref Range    HS C-Reactive Protein 0.188 0.010 - 0.500 mg/dL   Vitamin D,25-Hydroxy    Collection Time: 04/08/25 11:10 AM    Specimen: Blood   Result Value Ref Range    25 Hydroxy, Vitamin D 51.4 30.0 - 100.0 ng/ml   CBC Auto Differential    Collection Time: 04/08/25 11:10 AM    Specimen: Blood   Result Value Ref Range    WBC 5.83 3.40 - 10.80 10*3/mm3    RBC  5.25 3.77 - 5.28 10*6/mm3    Hemoglobin 17.1 (H) 12.0 - 15.9 g/dL    Hematocrit 51.6 (H) 34.0 - 46.6 %    MCV 98.3 (H) 79.0 - 97.0 fL    MCH 32.6 26.6 - 33.0 pg    MCHC 33.1 31.5 - 35.7 g/dL    RDW 12.3 12.3 - 15.4 %    RDW-SD 44.0 37.0 - 54.0 fl    MPV 11.8 6.0 - 12.0 fL    Platelets 191 140 - 450 10*3/mm3    Neutrophil % 49.7 42.7 - 76.0 %    Lymphocyte % 33.3 19.6 - 45.3 %    Monocyte % 8.9 5.0 - 12.0 %    Eosinophil % 7.0 (H) 0.3 - 6.2 %    Basophil % 0.9 0.0 - 1.5 %    Immature Grans % 0.2 0.0 - 0.5 %    Neutrophils, Absolute 2.90 1.70 - 7.00 10*3/mm3    Lymphocytes, Absolute 1.94 0.70 - 3.10 10*3/mm3    Monocytes, Absolute 0.52 0.10 - 0.90 10*3/mm3    Eosinophils, Absolute 0.41 (H) 0.00 - 0.40 10*3/mm3    Basophils, Absolute 0.05 0.00 - 0.20 10*3/mm3    Immature Grans, Absolute 0.01 0.00 - 0.05 10*3/mm3    nRBC 0.0 0.0 - 0.2 /100 WBC   Urinalysis without microscopic (no culture) - Urine, Clean Catch    Collection Time: 04/08/25 11:10 AM    Specimen: Urine, Clean Catch   Result Value Ref Range    Color, UA Yellow Yellow, Straw    Appearance, UA Clear Clear    pH, UA <=5.0 5.0 - 8.0    Specific Gravity, UA 1.023 1.005 - 1.030    Glucose, UA Negative Negative    Ketones, UA Trace (A) Negative    Bilirubin, UA Negative Negative    Blood, UA Negative Negative    Protein, UA Negative Negative    Leuk Esterase, UA Small (1+) (A) Negative    Nitrite, UA Negative Negative    Urobilinogen, UA 0.2 E.U./dL 0.2 - 1.0 E.U./dL   Urinalysis, Microscopic Only - Urine, Clean Catch    Collection Time: 04/08/25 11:10 AM    Specimen: Urine, Clean Catch   Result Value Ref Range    RBC, UA 0-2 None Seen, 0-2 /HPF    WBC, UA 6-10 (A) None Seen, 0-2 /HPF    Bacteria, UA None Seen None Seen /HPF    Squamous Epithelial Cells, UA 3-6 (A) None Seen, 0-2 /HPF    Hyaline Casts, UA None Seen None Seen /LPF    Methodology Automated Microscopy      5/9/25 R hand/wrist Xray - mild degen changes DIP joints index/middle finger; no fxs; nl joint  spaces and bone mineralization    ASSESSMENT/PLAN    Diagnoses and all orders for this visit:    1. Right hand pain (Primary)  Assessment & Plan:  Persistent > 1 yr, now having secondary weakness; s/p PT, rest, and NSAIDs without significant improvement; proceed with R hand MRI and then hand ortho consultation    Orders:  -     MRI Hand Right Without Contrast; Future  -     Ambulatory Referral to Hand Surgery        FOLLOW-UP  Health maintenance - flu vacc and COVID19 vacc done for this season; rec RSV vacc in the fall  RTC 10/14/25 as scheduled with LFTs, A1C, vit D (prev escribed)    Electronically signed by:    Noelle Deleon MD, FACP  06/23/2025

## 2025-06-23 NOTE — ASSESSMENT & PLAN NOTE
Persistent > 1 yr, now having secondary weakness; s/p PT, rest, and NSAIDs without significant improvement; proceed with R hand MRI and then hand ortho consultation

## 2025-06-30 ENCOUNTER — HOSPITAL ENCOUNTER (OUTPATIENT)
Facility: HOSPITAL | Age: 65
Discharge: HOME | End: 2025-06-30
Admitting: INTERNAL MEDICINE
Payer: MEDICARE

## 2025-06-30 DIAGNOSIS — M79.641 RIGHT HAND PAIN: Chronic | ICD-10-CM

## 2025-06-30 PROCEDURE — 73218 MRI UPPER EXTREMITY W/O DYE: CPT

## 2025-07-07 ENCOUNTER — OFFICE VISIT (OUTPATIENT)
Dept: ORTHOPEDIC SURGERY | Facility: CLINIC | Age: 65
End: 2025-07-07
Payer: MEDICARE

## 2025-07-07 VITALS
BODY MASS INDEX: 31.28 KG/M2 | WEIGHT: 169.97 LBS | SYSTOLIC BLOOD PRESSURE: 126 MMHG | DIASTOLIC BLOOD PRESSURE: 82 MMHG | HEIGHT: 62 IN

## 2025-07-07 DIAGNOSIS — M25.731 CARPAL BOSS OF RIGHT WRIST: Primary | ICD-10-CM

## 2025-07-07 PROCEDURE — 1159F MED LIST DOCD IN RCRD: CPT | Performed by: STUDENT IN AN ORGANIZED HEALTH CARE EDUCATION/TRAINING PROGRAM

## 2025-07-07 PROCEDURE — 99204 OFFICE O/P NEW MOD 45 MIN: CPT | Performed by: STUDENT IN AN ORGANIZED HEALTH CARE EDUCATION/TRAINING PROGRAM

## 2025-07-07 PROCEDURE — 1160F RVW MEDS BY RX/DR IN RCRD: CPT | Performed by: STUDENT IN AN ORGANIZED HEALTH CARE EDUCATION/TRAINING PROGRAM

## 2025-07-07 NOTE — PROGRESS NOTES
"                                                                 [facility]     Office Visit       Date: 07/07/2025   Patient Name: Varsha Lockhart  MRN: [id number]  YOB: 1960    Referring Physician: Noelle Deleon MD     Chief Complaint:   Chief Complaint   Patient presents with    Right Hand - Pain     History of Present Illness:   Varsha Lockhart is a 65 y.o. female right-hand-dominant presented clinic as new patient complaints of right hand pain.  She reports having diffuse dorsal radial hand and wrist pain since 2019.  She has been seen by 2 other hand providers in the past.  She reports difficulty with playing tennis specifically but is able to play sports such as pickleball.  She is attended a course of physical therapy without improvements.  She has no pain at rest but significant pain, 10/10 while playing tennis.  She has had a right thumb CMC corticosteroid injection in November 2024 with no improvement in her symptoms.  No other complaints or concerns.    Subjective   Review of Systems:   Review of Systems   Pertinent review of systems per HPI    I reviewed the patient's chief complaint, history of present illness, review of systems, past medical history, surgical history, family history, social history, medications and allergy list in the EMR on 07/07/2025 and agree with the findings above.    Objective    Vital Signs:   Vitals:    07/07/25 1302   BP: 126/82   BP Location: Right arm   Patient Position: Sitting   Weight: 77.1 kg (169 lb 15.6 oz)   Height: 157.5 cm (62.01\")     General: No acute distress. Alert and oriented.   Cardiovascular: Palpable radial pulse.   Respiratory: Breathing is nonlabored.   Ortho Exam:  Examination of the right upper extremity demonstrates a dorsal carpal boss at the insertion of the ECRB and ECRL tendons.  This is mildly tender to palpation.  Nontender throughout the second extensor compartment proximally.  Nontender at the first " compartment and thumb CMC joint.  Negative CMC joint grind.  She has full and symmetric wrist range of motion.  She is able make composite fist.  Sensation is intact light touch of the hand.  Warm well-perfused distally.    Imaging / Studies:    Imaging Results (Last 24 Hours)       ** No results found for the last 24 hours. **        Right wrist and hand x-rays obtained on 5/9/2025 were personally reviewed interpreted by myself.  Well-maintained joint spaces throughout the wrist and carpus with mild degenerative changes noted at the IP joints.  Normal soft tissue window.  Normal alignment.  No acute findings.    Right hand MRI obtained on 6/30/2025 was personally reviewed and interpreted myself.  Diffuse degenerative changes throughout the IP joints.  No evidence of focal soft tissue swelling or edema.    Assessment / Plan    Assessment/Plan:   Varsha Lockhart is a 65 y.o. female with a right hand dorsal carpal boss involving the ECRB and ECRL tendons.    I discussed with the patient their clinical and radiographic findings demonstrate a dorsal carpal boss.  We had a lengthy discussion regarding the pathophysiology of their diagnosis.  We discussed that carpal bossing is due to underlying a bony formation at the tendinous insertion of the ECRB and ECRL tendons that has occurred with time.  This can lead to additional wrist tendinitis.  We discussed both the operative and nonoperative treatment options.  Nonoperative treatment includes oral topical anti-inflammatories, wrist splinting, therapy.  Corticosteroid injections are potentially an option, however in the patient's case, she has a large carpal boss and injection would improve inflammation of the tendons but would not decrease the amount of carpal bossing.  Operative treatments include excision of the dorsal carpal boss.  I discussed operative treatment does not guarantee resolution of her symptoms postoperatively or eliminate recurrence.  After expressing  understanding of all options, the patient elects to proceed with continued oral topical anti-inflammatories, brace wear, and therapy directed exercises.  She does not wish to proceed with invasive treatment at this time.  She like to call the office as needed for reevaluation.  They were agreeable with the plan.  All questions and concerns were addressed.       ICD-10-CM ICD-9-CM   1. Carpal boss of right wrist  M25.731 726.91     Follow Up:   Return if symptoms worsen or fail to improve.      Joy Queen MD  [facility]   FOLLOW UP WITH YOUR DOCTOR THIS WEEK FOR REEVALUATION. [facility] FOLLOW UP AS WELL.    RETURN TO ED IMMEDIATELY WITH ANY WORSENING SYMPTOMS, CHEST PAIN, SHORTNESS OF BREATH, FEVERS, WEAKNESS, DIZZINESS, PERSISTENT OR SEVERE HEADACHE OR ANY OTHER CONCERNS.     Strain    A strain is a stretch or tear in one of the muscles in your body. This is caused by an injury to the area such as a twisting mechanism. Symptoms include pain, swelling, or bruising. Rest that area over the next several days and slowly resume activity when tolerated. Ice can help with swelling and pain.     SEEK IMMEDIATE MEDICAL CARE IF YOU HAVE ANY OF THE FOLLOWING SYMPTOMS: worsening pain, inability to move that body part, numbness or tingling.     Contusion    A contusion is a deep bruise. Contusions are the result of a blunt injury to tissues and muscle fibers under the skin. The skin overlying the contusion may turn blue, purple, or yellow. Symptoms also include pain and swelling in the injured area.    SEEK IMMEDIATE MEDICAL CARE IF YOU HAVE ANY OF THE FOLLOWING SYMPTOMS: severe pain, numbness, tingling, pain, weakness, or skin color/temperature change in any part of your body distal to the injury.

## 2025-07-29 ENCOUNTER — TELEPHONE (OUTPATIENT)
Dept: ORTHOPEDIC SURGERY | Facility: CLINIC | Age: 65
End: 2025-07-29
Payer: MEDICARE

## 2025-07-29 NOTE — TELEPHONE ENCOUNTER
Called patient to schedule f/u with Dr Bret killian available 8/18/25, no ans, lvm, Hub okay to relay